# Patient Record
Sex: FEMALE | Race: WHITE | NOT HISPANIC OR LATINO | Employment: UNEMPLOYED | URBAN - METROPOLITAN AREA
[De-identification: names, ages, dates, MRNs, and addresses within clinical notes are randomized per-mention and may not be internally consistent; named-entity substitution may affect disease eponyms.]

---

## 2019-12-31 ENCOUNTER — HOSPITAL ENCOUNTER (EMERGENCY)
Facility: MEDICAL CENTER | Age: 29
End: 2019-12-31
Attending: EMERGENCY MEDICINE
Payer: COMMERCIAL

## 2019-12-31 VITALS
HEART RATE: 107 BPM | HEIGHT: 62 IN | BODY MASS INDEX: 18.37 KG/M2 | SYSTOLIC BLOOD PRESSURE: 130 MMHG | RESPIRATION RATE: 18 BRPM | TEMPERATURE: 98.7 F | DIASTOLIC BLOOD PRESSURE: 94 MMHG | WEIGHT: 99.8 LBS | OXYGEN SATURATION: 98 %

## 2019-12-31 DIAGNOSIS — Z00.8 MEDICAL CLEARANCE FOR PSYCHIATRIC ADMISSION: ICD-10-CM

## 2019-12-31 LAB
AMPHET UR QL SCN: POSITIVE
BARBITURATES UR QL SCN: NEGATIVE
BENZODIAZ UR QL SCN: NEGATIVE
BZE UR QL SCN: NEGATIVE
CANNABINOIDS UR QL SCN: NEGATIVE
METHADONE UR QL SCN: NEGATIVE
OPIATES UR QL SCN: NEGATIVE
OXYCODONE UR QL SCN: NEGATIVE
PCP UR QL SCN: NEGATIVE
POC BREATHALIZER: 0 PERCENT (ref 0–0.01)
PROPOXYPH UR QL SCN: NEGATIVE

## 2019-12-31 PROCEDURE — 80307 DRUG TEST PRSMV CHEM ANLYZR: CPT

## 2019-12-31 PROCEDURE — 99285 EMERGENCY DEPT VISIT HI MDM: CPT

## 2019-12-31 PROCEDURE — 302970 POC BREATHALIZER: Performed by: EMERGENCY MEDICINE
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
## 2020-01-01 NOTE — ED PROVIDER NOTES
ED Provider Note    CHIEF COMPLAINT  Chief Complaint   Patient presents with   • Medical Clearance     sent here for [facility]  for clearance       HPI  Anika Woody is a 29 y.o. female who presents sent here for medical clearance from [facility].  Patient states the she is having flight of ideas and suicidal ideation.  Patient has past suicide attempts.  Patient states she plans to take a weeks worth of her pills    REVIEW OF SYSTEMS  See HPI for further details.  No fever no chills.  No cough or cold symptoms.  Positive suicidal ideation all other systems are negative.    PAST MEDICAL HISTORY  History reviewed. No pertinent past medical history.    FAMILY HISTORY  Family History   Problem Relation Age of Onset   • Hypertension Other    • Cancer Other        SOCIAL HISTORY  Social History     Socioeconomic History   • Marital status: Single     Spouse name: Not on file   • Number of children: Not on file   • Years of education: Not on file   • Highest education level: Not on file   Occupational History   • Not on file   Social Needs   • Financial resource strain: Not on file   • Food insecurity:     Worry: Not on file     Inability: Not on file   • Transportation needs:     Medical: Not on file     Non-medical: Not on file   Tobacco Use   • Smoking status: Never Smoker   • Smokeless tobacco: Never Used   Substance and Sexual Activity   • Alcohol use: Yes     Comment: once a week   • Drug use: Yes     Types: Oral     Comment: edible   • Sexual activity: Not on file   Lifestyle   • Physical activity:     Days per week: Not on file     Minutes per session: Not on file   • Stress: Not on file   Relationships   • Social connections:     Talks on phone: Not on file     Gets together: Not on file     Attends Denominational service: Not on file     Active member of club or organization: Not on file     Attends meetings of clubs or organizations: Not on file     Relationship status: Not on file   • Intimate partner  "violence:     Fear of current or ex partner: Not on file     Emotionally abused: Not on file     Physically abused: Not on file     Forced sexual activity: Not on file   Other Topics Concern   • Not on file   Social History Narrative   • Not on file       SURGICAL HISTORY  Past Surgical History:   Procedure Laterality Date   • MAMMOPLASTY AUGMENTATION  5/10/2011    Performed by SPENCER BOOKER at SURGERY [facility] ORS   • TONSILLECTOMY  6/2007       CURRENT MEDICATIONS  Home Medications     Reviewed by Laina Zhong R.N. (Registered Nurse) on 12/31/19 at 1509  Med List Status: Partial   Medication Last Dose Status   Amphetamine-Dextroamphetamine (ADDERALL PO) 12/31/2019 Active   buPROPion HCl (WELLBUTRIN PO) 12/31/2019 Active   NON SPECIFIED  Active   NON SPECIFIED  Active                ALLERGIES  No Known Allergies    PHYSICAL EXAM  VITAL SIGNS: /88   Pulse (!) 103   Temp 37.7 °C (99.8 °F) (Temporal)   Resp 18   Ht 1.575 m (5' 2\")   Wt 45.3 kg (99 lb 12.8 oz)   LMP 12/15/2019   SpO2 99%   BMI 18.25 kg/m²   Constitutional: Well developed, Well nourished, No acute distress,   HENT: Normocephalic, Atraumatic, Bilateral external ears normal, Oropharynx moist, No oral exudates, Nose normal.   Eyes: PERRLA, EOMI, Conjunctiva normal, No discharge.   Neck: Normal range of motion, No tenderness, Supple, No stridor.   Cardiovascular: Normal heart rate, Normal rhythm, No murmurs, No rubs, No gallops.   Thorax & Lungs: Normal breath sounds, No respiratory distress, No wheezing, No chest tenderness.  Abdomen: Bowel sounds normal, Soft, No tenderness, No masses, No pulsatile masses.    Skin: Warm, Dry, No erythema, No rash.   Extremities: No edema, No tenderness, No cyanosis, No clubbing. Dorsalis pedis pulses 2+ equal bilaterally. Radial pulses 2+ equal bilaterally  Neurologic: Normal deep tendon reflex strength sensation intact.  Normal gait  Psychiatric: Awake alert.  Oriented x3.  Limited judgment and " insight    Results for orders placed or performed during the hospital encounter of 12/31/19   URINE DRUG SCREEN   Result Value Ref Range    Amphetamines Urine Positive (A) Negative    Barbiturates Negative Negative    Benzodiazepines Negative Negative    Cocaine Metabolite Negative Negative    Methadone Negative Negative    Opiates Negative Negative    Oxycodone Negative Negative    Phencyclidine -Pcp Negative Negative    Propoxyphene Negative Negative    Cannabinoid Metab Negative Negative   POC BREATHALIZER   Result Value Ref Range    POC Breathalizer 0.001 0.00 - 0.01 Percent         COURSE & MEDICAL DECISION MAKING  Pertinent Labs & Imaging studies reviewed. (See chart for details)  Patient was placed on a legal hold by the psychiatrist at [facility].  She is medically cleared at this facility and able to be transferred in stable condition        FINAL IMPRESSION  1.  Depression with suicidal ideation  2.  Medical clearance  3.        Electronically signed by: Palomo Wahl, 12/31/2019 5:29 PM

## 2020-03-05 ENCOUNTER — HOSPITAL ENCOUNTER (EMERGENCY)
Facility: MEDICAL CENTER | Age: 30
End: 2020-03-05
Attending: EMERGENCY MEDICINE
Payer: COMMERCIAL

## 2020-03-05 VITALS
DIASTOLIC BLOOD PRESSURE: 67 MMHG | HEIGHT: 62 IN | TEMPERATURE: 98.2 F | OXYGEN SATURATION: 99 % | BODY MASS INDEX: 20.98 KG/M2 | HEART RATE: 80 BPM | RESPIRATION RATE: 16 BRPM | WEIGHT: 114 LBS | SYSTOLIC BLOOD PRESSURE: 122 MMHG

## 2020-03-05 DIAGNOSIS — R45.851 SUICIDAL IDEATION: ICD-10-CM

## 2020-03-05 LAB
AMPHET UR QL SCN: NEGATIVE
BARBITURATES UR QL SCN: NEGATIVE
BENZODIAZ UR QL SCN: NEGATIVE
BZE UR QL SCN: NEGATIVE
CANNABINOIDS UR QL SCN: NEGATIVE
METHADONE UR QL SCN: NEGATIVE
OPIATES UR QL SCN: NEGATIVE
OXYCODONE UR QL SCN: NEGATIVE
PCP UR QL SCN: NEGATIVE
POC BREATHALIZER: 0 PERCENT (ref 0–0.01)
PROPOXYPH UR QL SCN: NEGATIVE

## 2020-03-05 PROCEDURE — 90791 PSYCH DIAGNOSTIC EVALUATION: CPT

## 2020-03-05 PROCEDURE — 302970 POC BREATHALIZER

## 2020-03-05 PROCEDURE — 80307 DRUG TEST PRSMV CHEM ANLYZR: CPT

## 2020-03-05 PROCEDURE — 302970 POC BREATHALIZER: Performed by: EMERGENCY MEDICINE

## 2020-03-05 PROCEDURE — 99285 EMERGENCY DEPT VISIT HI MDM: CPT

## 2020-03-05 RX ORDER — PALIPERIDONE 3 MG/1
3 TABLET, EXTENDED RELEASE ORAL EVERY MORNING
Status: SHIPPED | COMMUNITY
End: 2022-04-05

## 2020-03-05 RX ORDER — HYDROXYZINE HYDROCHLORIDE 25 MG/1
25 TABLET, FILM COATED ORAL 2 TIMES DAILY PRN
Status: SHIPPED | COMMUNITY
End: 2022-04-05

## 2020-03-05 RX ORDER — TOPIRAMATE 25 MG/1
25 TABLET ORAL
Status: SHIPPED | COMMUNITY
End: 2022-04-05

## 2020-03-05 RX ORDER — TRANEXAMIC ACID 650 MG/1
1300 TABLET ORAL 3 TIMES DAILY
Status: SHIPPED | COMMUNITY
End: 2022-04-05

## 2020-03-05 NOTE — ED PROVIDER NOTES
ED Provider Note    CHIEF COMPLAINT  Chief Complaint   Patient presents with   • Suicidal Ideation   • Psych Eval       HPI  Anika Woody is a 29 y.o. female who presents for evaluation of suicidal ideation.  Patient is brought in from [facility].  She evidently was having some erratic behavior there.  She states that she lost her temper and is feeling better at this point.  She states that she is suicidal and just wishes she could die.  She states she has done nothing to this point to harm herself.  If she could hold her breath long enough she would like to just hold her breath and die.  She has no medical complaints at this time.  She has no significant past medical history.    REVIEW OF SYSTEMS  See HPI for further details. All other systems negative.    PAST MEDICAL HISTORY  No past medical history on file.    FAMILY HISTORY  Family History   Problem Relation Age of Onset   • Hypertension Other    • Cancer Other        SOCIAL HISTORY  Social History     Socioeconomic History   • Marital status: Single     Spouse name: Not on file   • Number of children: Not on file   • Years of education: Not on file   • Highest education level: Not on file   Occupational History   • Not on file   Social Needs   • Financial resource strain: Not on file   • Food insecurity     Worry: Not on file     Inability: Not on file   • Transportation needs     Medical: Not on file     Non-medical: Not on file   Tobacco Use   • Smoking status: Never Smoker   • Smokeless tobacco: Never Used   Substance and Sexual Activity   • Alcohol use: Yes     Comment: once a week   • Drug use: Yes     Types: Oral     Comment: edible   • Sexual activity: Not on file   Lifestyle   • Physical activity     Days per week: Not on file     Minutes per session: Not on file   • Stress: Not on file   Relationships   • Social connections     Talks on phone: Not on file     Gets together: Not on file     Attends [employer] service: Not on file     Active member of  "club or organization: Not on file     Attends meetings of clubs or organizations: Not on file     Relationship status: Not on file   • Intimate partner violence     Fear of current or ex partner: Not on file     Emotionally abused: Not on file     Physically abused: Not on file     Forced sexual activity: Not on file   Other Topics Concern   • Not on file   Social History Narrative   • Not on file       SURGICAL HISTORY  Past Surgical History:   Procedure Laterality Date   • MAMMOPLASTY AUGMENTATION  5/10/2011    Performed by SPENCER BOOKER at SURGERY [facility] ORS   • TONSILLECTOMY  6/2007       CURRENT MEDICATIONS  Home Medications    **Home medications have not yet been reviewed for this encounter**         ALLERGIES  No Known Allergies    PHYSICAL EXAM  VITAL SIGNS: /76   Pulse 82   Temp 36.6 °C (97.9 °F) (Temporal)   Resp 15   Ht 1.575 m (5' 2\")   Wt 51.7 kg (114 lb)   SpO2 97%   BMI 20.85 kg/m²   Constitutional: Well developed, Well nourished, tearful.  HENT: Normocephalic, Atraumatic.  Eyes:  EOMI, Conjunctiva normal, No discharge.   Cardiovascular: Normal heart rate.   Thorax & Lungs: No respiratory distress.  Skin: Warm, Dry.  Musculoskeletal: Good range of motion in all major joints.   Neurologic: Awake and alert, No focal deficits noted.       COURSE & MEDICAL DECISION MAKING  Pertinent Labs & Imaging studies reviewed. (See chart for details)  This is a 29-year-old [facility] for [facility].  During my evaluation she is quite tearful and is acutely suicidal.  At this point she has no medical emergency and I see no indication for imaging or laboratory.  She will be evaluated by our alert team for disposition.    FINAL IMPRESSION  1.  Suicidal ideation  2.   3.         Electronically signed by: Jevon Cortes M.D., 3/5/2020 11:18 AM    "

## 2020-03-05 NOTE — ED NOTES
Hand off report given to HUANG Harper   Patient chart and current status reviewed with oncoming RN and all questions answered  This RN's primary care of patient terminated at this time

## 2020-03-05 NOTE — ED NOTES
Pt resting quietly in bed with eyes closed. Respirations even and unlabored. 1:1 sitter for direct observation. No new signs or symptoms of distress noted. Will continue to monitor.
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
## 2020-03-05 NOTE — ED NOTES
Pt laying in bed with eyes closed.   Pt appears comfortable at this time.  Equal rise and fall of chest noted. Breaths equal and non labored.  No signs or symptoms of distress noted at this time.  Will continue to monitor.

## 2020-03-06 NOTE — DISCHARGE PLANNING
Alert Team    Patient sitting on bed at time of rounding; this writer explained the legal hold process, which she noted she understood. Patient mood noted as euthymic; patient speaking clearly/coherently with insight in regards to her current situation and today's events. She reports she is no longer feeling suicidal and reports that she is in the process of learning more about her diagnoss of Schizoaffective DO. She denies AH/VH. Patient currently on a legal hold; she is scheduled to transfer to [facility] shortly; patient agreeable to plan, she notes no distress or concerns. Nothing further to note at this time.

## 2020-03-06 NOTE — ED NOTES
Med rec partially updated per list in pt chart from [facility]. Med rec left message and waiting for [facility] to call back with information about last doses.
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
## 2020-03-06 NOTE — DISCHARGE PLANNING
Medical Social Work    Referral: Legal Hold    Intervention: Legal Hold Paperwork given to SW by [employer] RN Gina Hook    Legal Hold Initiated: Date: 03/05/2020 Time: 1030    Patient’s Insurance Listed on Face Sheet: [place] Medicaid    Referrals sent to: [place] and [facility]    This referral contains the following information:  1) Face sheet _x___  2) Page 1 and Page 2 of Legal Hold __x__  3) Alert Team Assessment/Psych Assessment __x__  4) Head to toe physical exam __x__  5) Urine Drug Screen __x__  6) Blood Alcohol __x__  7) Vital signs _x___  8) Pregnancy test when applicable _P__  9) Medications list __x__    Plan: Patient will transfer to mental health facility once acceptance is obtained

## 2020-03-06 NOTE — DISCHARGE PLANNING
Medical Social Work     Pt has been accepted to [facility] by Dr. Simmons.  Geoffrey called [facility] and set up transport through [place] for 2300.  Geoffrey updated  bedside rn, and completed transfer packet.    GEOFFREY called [facility] and spoke with Norma for a trip ticket.     Plan: GEOFFREY will remain available for pt support.

## 2020-04-10 ENCOUNTER — NON-PROVIDER VISIT (OUTPATIENT)
Dept: URGENT CARE | Facility: PHYSICIAN GROUP | Age: 30
End: 2020-04-10

## 2020-04-10 DIAGNOSIS — Z02.1 PRE-EMPLOYMENT DRUG SCREENING: ICD-10-CM

## 2020-04-10 LAB
AMP AMPHETAMINE: NORMAL
COC COCAINE: NORMAL
INT CON NEG: NORMAL
INT CON POS: NORMAL
MET METHAMPHETAMINES: NORMAL
OPI OPIATES: NORMAL
PCP PHENCYCLIDINE: NORMAL
POC DRUG COMMENT 753798-OCCUPATIONAL HEALTH: NEGATIVE
THC: NORMAL

## 2020-04-10 PROCEDURE — 80305 DRUG TEST PRSMV DIR OPT OBS: CPT | Performed by: PHYSICIAN ASSISTANT

## 2021-03-04 ENCOUNTER — HOSPITAL ENCOUNTER (EMERGENCY)
Facility: MEDICAL CENTER | Age: 31
End: 2021-03-05
Attending: EMERGENCY MEDICINE
Payer: MEDICAID

## 2021-03-04 DIAGNOSIS — F30.9 MANIA (HCC): ICD-10-CM

## 2021-03-04 DIAGNOSIS — F25.0 SCHIZOAFFECTIVE DISORDER, BIPOLAR TYPE (HCC): ICD-10-CM

## 2021-03-04 LAB — POC BREATHALIZER: 0 PERCENT (ref 0–0.01)

## 2021-03-04 PROCEDURE — 99285 EMERGENCY DEPT VISIT HI MDM: CPT

## 2021-03-04 PROCEDURE — 80307 DRUG TEST PRSMV CHEM ANLYZR: CPT

## 2021-03-04 PROCEDURE — 302970 POC BREATHALIZER: Performed by: EMERGENCY MEDICINE

## 2021-03-04 ASSESSMENT — LIFESTYLE VARIABLES: DO YOU DRINK ALCOHOL: NO

## 2021-03-05 VITALS
RESPIRATION RATE: 18 BRPM | WEIGHT: 114.64 LBS | SYSTOLIC BLOOD PRESSURE: 119 MMHG | HEART RATE: 102 BPM | TEMPERATURE: 97.8 F | OXYGEN SATURATION: 97 % | HEIGHT: 62 IN | BODY MASS INDEX: 21.1 KG/M2 | DIASTOLIC BLOOD PRESSURE: 76 MMHG

## 2021-03-05 LAB
AMPHET UR QL SCN: POSITIVE
BARBITURATES UR QL SCN: NEGATIVE
BENZODIAZ UR QL SCN: NEGATIVE
BZE UR QL SCN: NEGATIVE
CANNABINOIDS UR QL SCN: NEGATIVE
METHADONE UR QL SCN: NEGATIVE
OPIATES UR QL SCN: NEGATIVE
OXYCODONE UR QL SCN: NEGATIVE
PCP UR QL SCN: NEGATIVE
PROPOXYPH UR QL SCN: NEGATIVE

## 2021-03-05 PROCEDURE — 90791 PSYCH DIAGNOSTIC EVALUATION: CPT
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
## 2021-03-05 NOTE — ED NOTES
Pt transferred to [facility] via EMS. EMS has chart and belongings from [place] [id number]. She  is ambulatory out of ED with EMS, steady gait noted.

## 2021-03-05 NOTE — ED TRIAGE NOTES
"Chief Complaint   Patient presents with   • Legal 2000     Pt BIB by [employer] on legal hold. Per [employer] pt has been acting erratically per family x 3D and has been off her medication, behaviors include not eating or sleeping. Pt denies SI/HI, has rambling speech, states hx of schizoaffective disorder. AOx4. Pt's belongings removed per policy, safety precautions in place.     /93   Pulse (!) 118   Temp 36.9 °C (98.5 °F) (Temporal)   Resp 15   Ht 1.575 m (5' 2\")   Wt 52 kg (114 lb 10.2 oz)   SpO2 98%   BMI 20.97 kg/m²     "

## 2021-03-05 NOTE — ED NOTES
Pt screaming at the wall and talking to herself, responding to people who are not present, ERP aware of behavior.

## 2021-03-05 NOTE — ED NOTES
Patient up to the bathroom with steady gait, holding conversations with herself while in the bathroom
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
## 2021-03-05 NOTE — ED NOTES
Report received and care assumed of patient. Patient resting comfortably on gurney with eyes closed. Legal hold precautions in place.

## 2021-03-05 NOTE — DISCHARGE PLANNING
Medical Social Work    Referral: Legal hold Transfer to Mental Health Facility    Intervention: SW received call from [facility] stating that they have accepted the patient for admission.     Pt's accepting physcian is Dr. Sadiq GUERRERO arranged for transportation to be set up through [facility]    The pt will be picked up at 0900.     YOLANDA notified the RN of the departure time as well as accepting facility.     YOLANDA created transfer packet and placed on chart.     Plan: Pt will transfer back to [facility] at 0900

## 2021-03-05 NOTE — ED NOTES
Patient back in room, warm blanket provided, denies other needs at this time.  Will continue to monitor

## 2022-04-05 ENCOUNTER — HOSPITAL ENCOUNTER (EMERGENCY)
Facility: MEDICAL CENTER | Age: 32
End: 2022-04-05
Attending: EMERGENCY MEDICINE
Payer: COMMERCIAL

## 2022-04-05 VITALS
HEIGHT: 62 IN | BODY MASS INDEX: 20.24 KG/M2 | DIASTOLIC BLOOD PRESSURE: 66 MMHG | OXYGEN SATURATION: 98 % | TEMPERATURE: 97.7 F | SYSTOLIC BLOOD PRESSURE: 110 MMHG | HEART RATE: 65 BPM | WEIGHT: 110 LBS | RESPIRATION RATE: 16 BRPM

## 2022-04-05 DIAGNOSIS — F29 PSYCHOSIS, UNSPECIFIED PSYCHOSIS TYPE (HCC): ICD-10-CM

## 2022-04-05 LAB
AMPHET UR QL SCN: POSITIVE
BARBITURATES UR QL SCN: NEGATIVE
BENZODIAZ UR QL SCN: NEGATIVE
BZE UR QL SCN: NEGATIVE
CANNABINOIDS UR QL SCN: NEGATIVE
HCG UR QL: NEGATIVE
METHADONE UR QL SCN: NEGATIVE
OPIATES UR QL SCN: NEGATIVE
OXYCODONE UR QL SCN: NEGATIVE
PCP UR QL SCN: NEGATIVE
POC BREATHALIZER: 0 PERCENT (ref 0–0.01)
PROPOXYPH UR QL SCN: NEGATIVE

## 2022-04-05 PROCEDURE — 700111 HCHG RX REV CODE 636 W/ 250 OVERRIDE (IP): Performed by: EMERGENCY MEDICINE

## 2022-04-05 PROCEDURE — 302970 POC BREATHALIZER: Performed by: EMERGENCY MEDICINE

## 2022-04-05 PROCEDURE — 96372 THER/PROPH/DIAG INJ SC/IM: CPT

## 2022-04-05 PROCEDURE — 700102 HCHG RX REV CODE 250 W/ 637 OVERRIDE(OP): Performed by: EMERGENCY MEDICINE

## 2022-04-05 PROCEDURE — 80307 DRUG TEST PRSMV CHEM ANLYZR: CPT

## 2022-04-05 PROCEDURE — 81025 URINE PREGNANCY TEST: CPT

## 2022-04-05 PROCEDURE — A9270 NON-COVERED ITEM OR SERVICE: HCPCS | Performed by: EMERGENCY MEDICINE

## 2022-04-05 PROCEDURE — 90791 PSYCH DIAGNOSTIC EVALUATION: CPT

## 2022-04-05 PROCEDURE — 99285 EMERGENCY DEPT VISIT HI MDM: CPT

## 2022-04-05 RX ORDER — LORAZEPAM 1 MG/1
1 TABLET ORAL ONCE
Status: COMPLETED | OUTPATIENT
Start: 2022-04-05 | End: 2022-04-05

## 2022-04-05 RX ORDER — HALOPERIDOL 5 MG/ML
5 INJECTION INTRAMUSCULAR ONCE
Status: COMPLETED | OUTPATIENT
Start: 2022-04-05 | End: 2022-04-05

## 2022-04-05 RX ADMIN — LORAZEPAM 1 MG: 1 TABLET ORAL at 22:17

## 2022-04-05 RX ADMIN — HALOPERIDOL LACTATE 5 MG: 5 INJECTION, SOLUTION INTRAMUSCULAR at 12:23

## 2022-04-05 ASSESSMENT — LIFESTYLE VARIABLES
DOES PATIENT WANT TO STOP DRINKING: NO
DO YOU DRINK ALCOHOL: NO

## 2022-04-05 NOTE — ED PROVIDER NOTES
"ED Provider Note    Scribed for Gibson Reece M.D. by Fidel Cifuentes. 4/5/2022, 11:00 AM.    Primary care provider: Pcp Pt States None  Means of arrival: EMS  History obtained from: Patient  History limited by: Ritika Historian    CHIEF COMPLAINT  Chief Complaint   Patient presents with   • Psych Eval     Pt BIB EMS from home for manic behavior. Per EMS, pt has been noncompliant with her prescribed psychiatric medication. Pt denies SI and HI and is cooperative at this time.        HPI  Anika Woody is a 31 y.o. female who presents to the Emergency Department for evaluation of manic behavior onset today. She states that she was brought to the ED today from home via EMS because her family felt that her behavior was erratic. She notes that there are young children who live in her neighborhood and the same individuals that called EMS stated that her behavior was affecting them, prompting them to call  EMS. She denies any abdominal pain, fevers, or cough. She has a past medical history psychiatric issues, but she refuses to elaborate further. She notes that she is currently prescribed psychiatric medication, but she did not take any today as the \"good lord told me not to\". There are no known alleviating or exacerbating factors.     History limited by: Ritika Historian    REVIEW OF SYSTEMS  Pertinent positives include manic behavior. Pertinent negatives include no fever, cough, or abdominal pain.  All other systems reviewed and negative.  History limited by: Ritika Historian    PAST MEDICAL HISTORY   Psychiatric disorders.     SURGICAL HISTORY   has a past surgical history that includes tonsillectomy (6/2007) and mammoplasty augmentation (5/10/2011).    SOCIAL HISTORY  Social History     Tobacco Use   • Smoking status: Never Smoker   • Smokeless tobacco: Never Used   Substance Use Topics   • Alcohol use: Yes     Comment: once a week   • Drug use: Yes     Types: Oral     Comment: edible      Social History     Substance and " "Sexual Activity   Drug Use Yes   • Types: Oral    Comment: edible       FAMILY HISTORY  Family History   Problem Relation Age of Onset   • Hypertension Other    • Cancer Other        CURRENT MEDICATIONS  Current Outpatient Medications   Medication Instructions   • hydrOXYzine HCl (ATARAX) 25 mg, Oral, 2 TIMES DAILY PRN   • paliperidone (INVEGA) 3 mg, Oral, EVERY MORNING   • sertraline (ZOLOFT) 50 mg, Oral, DAILY   • topiramate (TOPAMAX) 25 mg, Oral, EVERY BEDTIME   • Tranexamic Acid (LYSTEDA) 1,300 mg, Oral, 3 TIMES DAILY         ALLERGIES  No Known Allergies    PHYSICAL EXAM  VITAL SIGNS: /73   Pulse 81   Temp 35.9 °C (96.6 °F) (Temporal)   Resp 20   Ht 1.575 m (5' 2\")   Wt 49.9 kg (110 lb)   SpO2 97%   BMI 20.12 kg/m²     Constitutional: Well developed, Well nourished, No acute distress, Non-toxic appearance.   HENT: Normocephalic, Atraumatic, mucous membranes moist, no erythema, exudates, swelling, or masses, nares patent  Eyes: nonicteric  Neck: Supple, no meningismus  Lymphatic: No lymphadenopathy noted.   Cardiovascular: Regular rate and rhythm, no gallops rubs or murmurs  Lungs: Clear bilaterally   Abdomen: Bowel sounds normal, Soft, No tenderness  Skin: Warm, Dry, no rash  Genitalia: Deferred  Rectal: Deferred  Extremities: No edema  Neurologic: Alert, Oriented x3, appropriate, follows commands, moving all extremities, normal speech   Psychiatric: Affect normal, thoughts disorganized. She has wrapped the sheets in a turban like manner around her face.     DIAGNOSTIC STUDIES / PROCEDURES    LABS  Results for orders placed or performed during the hospital encounter of 04/05/22   POC BREATHALIZER   Result Value Ref Range    POC Breathalizer 0.00 0.00 - 0.01 Percent       All labs reviewed by me.      COURSE & MEDICAL DECISION MAKING  Nursing notes, VS, PMSFHx reviewed in chart.     11:00 AM Patient seen and examined at bedside. Ordered for Urine drug screen, POC breathalyzer, and HCG qualitative UR " lab to evaluate.    11:09 AM Patient placed on legal hold at this time. Paged Behavioral Health.    12:04 PM I was informed by Nursing staff that the patient was screaming and acting aggressive. She will be treated with haldol injection 5 mg.     4:31 PM Life skills evaluated that patient and is recommending continuing legal hold. The patients legal hold will be continued.     Decision Making:  This is a 31 y.o. year old female who presents with a history of apparent schizophrenia who presents with psychosis, increasing agitation and disorganized thinking.  The patient was placed on a legal hold and that has been continued after evaluation with life skills.  She is medically cleared.  Vital signs are stable.  She will be entered into ED observation status at 4:34 PM on April 5, 2002, pending transfer to a psychiatric facility.      FINAL IMPRESSION  1. Psychosis, unspecified psychosis type (HCC)          Fidel ROCHA (Scribangela), am scribing for, and in the presence of, Gibson Reece M.D..    Electronically signed by: Fidel Cifuentes (Cornelio), 4/5/2022    Gibson ROCHA M.D. personally performed the services described in this documentation, as scribed by Fidel Cifuentes in my presence, and it is both accurate and complete.    The note accurately reflects work and decisions made by me.  Gibson Reece M.D.  4/5/2022  4:35 PM

## 2022-04-05 NOTE — CONSULTS
"[facility]   TRIAGE ASSESSMENT    Name: Anika Woody  MRN: [id number]  : 1990  Age: 31 y.o.  Date of assessment: 2022  PCP: Pcp Pt States None  Persons in attendance: Patient  Patient Location: [facility]    CHIEF COMPLAINT/PRESENTING ISSUE (as stated by patient):   Chief Complaint   Patient presents with   • Psych Eval     Pt BIB EMS from home for manic behavior. Per EMS, pt has been noncompliant with her prescribed psychiatric medication. Pt denies SI and HI and is cooperative at this time.     patient states: \"I was just praising Rehan\"  Patient was not able to answer questions in detail she would only answer \"yes\" or \"no\"   Patient cannot name medications she speaks incoherently when asked direct questions.   Patient is denies SI and HI     Collateral information provided by patient mother: Mother states patient has been getting worse over the last week or so, and currently she believe the daughter has not slept in the last 4 nights, she also thinks that there could be some substances involved because the patient leaves home at night for \"a long time'.  Mom also states the patient is very loud and will go into the bathroom  And just be so loud and wont let her in.   Mom stated the patient started on a new medication recently but she could not provided any other details.     CURRENT LIVING SITUATION/SOCIAL SUPPORT/FINANCIAL RESOURCES: Patient lives at home with her mother, no current employment or other family.     BEHAVIORAL HEALTH/SUBSTANCE USE TREATMENT HISTORY  Does patient/parent report a history of prior behavioral health/substance use treatment for patient?   Yes:      Patient denies any other treatment but information below provided from medical record.   Dates Level of Care Facilty/Provider Diagnosis/Problem Medications   3/2021 OP Dr Leonard Ortega @ [facility]  Schizoaffective d/o Bipolar type 3//2020    3/5/2020 OP [facility] outpatient  " therapist Lala Isidro psychiatrist Dr Riggins Schizoaffective bipolar type, stimulant abuse     12/2019 IP  [facility]     2019 [facility]                                                      SAFETY ASSESSMENT - SELF  Does patient acknowledge current or past symptoms of dangerousness to self or is previous history noted? no  Does parent/significant other report patient has current or past symptoms of dangerousness to self? NO  Does presenting problem suggest symptoms of dangerousness to self? NO Denies SI    History of suicide by family member: no   History of suicide by friend/significant other: no  Recent change in frequency/specificity/intensity of suicidal thoughts or self-harm behavior? no  Current access to firearms, medications, or other identified means of suicide/self-harm? no  If yes, willing to restrict access to means of suicide/self-harm? no  Protective factors present:  Patient unable to give detailed information     SAFETY ASSESSMENT - OTHERS  Does patient acknowledge current or past symptoms of aggressive behavior or risk to others or is previous history noted? no  Does parent/significant other report patient has current or past symptoms of aggressive behavior or risk to others?  N\A  Does presenting problem suggest symptoms of dangerousness to others? No    LEGAL HISTORY  Does patient acknowledge history of arrest/[facility]/intermediate or is previous history noted? no    Crisis Safety Plan completed and copy given to patient? N\A    ABUSE/NEGLECT SCREENING  Does patient report feeling “unsafe” in his/her home, or afraid of anyone?  no  Does patient report any history of physical, sexual, or emotional abuse?  no  Does parent or significant other report any of the above? no  Is there evidence of neglect by self?  no  Is there evidence of neglect by a caregiver? no  Does the patient/parent report any history of CPS/APS/police involvement related to suspected abuse/neglect or domestic violence? no  Based on the  "information provided during the current assessment, is a mandated report of suspected abuse/neglect being made?  No    SUBSTANCE USE SCREENING  Yes:  Dae all substances used in the past 30 days:      Last Use Amount   []   Alcohol     []   Marijuana     []   Heroin     []   Prescription Opioids  (used without prescription, for    recreation, or in excess of prescribed amount)     []   Other Prescription  (used without prescription, for    recreation, or in excess of prescribed amount)     []   Cocaine      []   Methamphetamine     []   \"\" drugs (ectasy, MDMA)     [x]   Other substances: Adderall RX (pt unable to comfirm dose)          Breathalyzer results: 0.00  UDS results:positive for Amphetamines      What consequences does the patient associate with any of the above substance use and or addictive behaviors? None    Risk factors for detox (check all that apply):  []  Seizures   []  Diaphoretic (sweating)   []  Tremors   []  Hallucinations   []  Increased blood pressure   []  Decreased blood pressure   []  Other   [x]  None      [] Patient education on risk factors for detoxification and instructed to return to ER as needed.      MENTAL STATUS   Participation: Limited verbal participation  Grooming: Disheveled  Orientation: Alert and Disoriented to: Situation   Behavior: Tense  Eye contact: Limited  Mood: Anxious and Irritable Manic  Affect: Full range  Thought process: Tangential and Flight of ideas  Thought content: Preoccupation  Speech: Rapid  Perception: Derealization  Memory:  Poor memory for chronology of events  Insight: Poor  Judgment:  Poor  Other:    Collateral information:   Source:  [] Significant other present in person:   [] Significant other by telephone  [] [facility]   [x] [facility]  [x] [facility]  [x] Other: Patients mother Gracy [phone]    [] Unable to complete full assessment due to:  [] Acute intoxication  [] Patient declined to " participate/engage  [] Patient verbally unresponsive  [] Significant cognitive deficits  [] Significant perceptual distortions or behavioral disorganization  [] Other:      CLINICAL IMPRESSIONS:  Primary:  Psych Eval   Secondary:   Schizoaffective bipolar type by History       IDENTIFIED NEEDS/PLAN:  [Trigger DISPOSITION list for any items marked]    [x]  Imminent safety risk - self [] Imminent safety risk - others   []  Acute substance withdrawal [x]  Psychosis/Impaired reality testing   []  Mood/anxiety []  Substance use/Addictive behavior   []  Maladaptive behaviro []  Parent/child conflict   []  Family/Couples conflict []  Biomedical   []  Housing []  Financial   []   Legal  Occupational/Educational   []  Domestic violence []  Other:     Recommended Plan of Care:  Actively being addressed by [facility], Refer to [facility], [facility] and [facility] , 1:1 Observation and No visitors or belongings, Continue Legal Hold   *Telesitter may not be utilized for moderate or high risk patients    Has the Recommended Plan of Care/Level of Observation been reviewed with the patient's assigned nurse? Yes [place]   Does patient/parent or guardian express agreement with the above plan? yes  If a pediatric/adolescent patient, have out of town/out of state inpatient MH tx options been reviewed with parent/legal guardian with verbal consent given for referrals to be sent? yes    Referral appointment(s) scheduled? N\A    Alert team only:   I have discussed findings and recommendations with Dr. Reece who is in agreement with these recommendations.     Referral information sent to the following outpatient community providers :N/A     Referral information sent to the following inpatient community providers : [facility], [facility], [facility]     If applicable : Referred  to  Alert Team for legal hold follow up at (time): 4/5/2022      Ila RENDON  MACK Joiner  4/5/2022

## 2022-04-05 NOTE — ED TRIAGE NOTES
"Chief Complaint   Patient presents with   • Psych Eval     Pt BIB EMS from home for manic behavior. Per EMS, pt has been noncompliant with her prescribed psychiatric medication. Pt denies SI and HI and is cooperative at this time.      /73   Pulse 81   Temp 35.9 °C (96.6 °F) (Temporal)   Resp 20   Ht 1.575 m (5' 2\")   Wt 49.9 kg (110 lb)   SpO2 97%   BMI 20.12 kg/m²       "

## 2022-04-05 NOTE — ED NOTES
Break RN  Pt addierclayton removed from room and mattress placed on floor. Pt is playing with juan, unsafe at this time. Pt is pacing in room conversing with self. Sitter in direct observation.

## 2022-04-05 NOTE — ED NOTES
Medication reconciliation process is complete.  Per chart review and calls to local pharmacy.   No current prescription(s) fills at  [facility].    Chart review indicates  That pt has taken  Hydroxyzine 25 MG bidprn  invega 3 mg ER morning  sertraline 50 mg   Daily  topiramate 25mg  Bedtime  lysteda 650 mg tid

## 2022-04-05 NOTE — ED NOTES
Pt's personal clothes taken and pt dressed in a gown. Pt verbalizes understanding for a need for a urine sample. Pt laying on the ground and praying.

## 2022-04-05 NOTE — DISCHARGE PLANNING
Alert Team/Behavioral Health Note:    As of 1600, patient is not medically cleared. Waiting on medical clearance to send inpatient psychiatric referrals.

## 2022-04-05 NOTE — ED NOTES
Patient in hospital gown, all personal belongings removed. All potentially harmful non medically essential items removed from room. Belongings placed in locker in ambulance foyer.

## 2022-04-05 NOTE — CONSULTS
Alert team aware of ordered consult, the Alert Team staff will be available for consult at 1300 on 3/5/2022

## 2022-04-05 NOTE — ED NOTES
Received report and assumed care of pt. 1:1 sitter outside of room watching pt at all times. Pt is calm and cooperative and currently laying in bed.

## 2022-04-06 NOTE — ED NOTES
Pt attempting to elope after updated on plan of care. Pt redirected back to room. Alert team bedside. Pt remains under direct observation of 1:1 sitter at all times. Pt provided with socks and warm blanket.

## 2022-04-06 NOTE — ED NOTES
Pt taken to [facility] by [facility]. VSS at discharge. All belongings and legal hold paperwork given to ems at time of transport.

## 2022-04-06 NOTE — DISCHARGE PLANNING
Alert Team:     Referral: Minor/Adult Patient Transfer to Mental Health Facility     Intervention: Received call from [facility] stating that Dr. Robert has accepted the patient for admission.  Facility requests that transport be arranged for 2230.     Arranged for transportation to be set up through Susan at [facility] sunil Garcia with [facility].     The pt will be picked up at 2230.      Notified the RN of the departure time as well as accepting facility.      Created transfer packet and placed on chart. (Cobra completed with parent.)     Plan: Pt will transfer to [facility] at 2230.

## 2022-04-06 NOTE — ED NOTES
Pt sleeping in rney comfortably. No acute distress noted. Pt remains in direct observation of 1:1 sitter at all times.

## 2022-04-06 NOTE — DISCHARGE PLANNING
[facility] ED Behavioral Health Fax Referral      [facility] ED Behavioral Health Alert Team:  [phone]    Referral: Legal Hold    Intervention: Patient referral to [facility] inpatient  facillity    Legal Hold Initiated: Date: 4/5/22 Time: 1100    Patient’s Insurance Listed on Face Sheet: [employer]    Referrals sent to: [facility]    Referrals faxed by HUANG Luevano    This referral contains the following information:  1) Face sheet _x___  2) Page 1 and Page 2 of Legal Hold ___x_  3) Alert Team Assessment/Psych Assessment __x__  4) Head to toe physical exam __x__  5) Urine Drug Screen __x__  6) Blood Alcohol _x___  7) Vital signs _x___  8) Pregnancy test when applicable ___  9) Medications list __x__  10) Covid screening ____    Plan: Patient will transfer to mental health facility once acceptance is obtained

## 2022-04-06 NOTE — ED NOTES
Report received from HUANG Lindo. Pt resting on mattress comfortably. No acute distress noted. Pt remains under direct observation of 1:1 sitter at all times.

## 2022-04-06 NOTE — ED NOTES
Pt ambulated to BR with steady gait to provide urine sample. Urine sent. Pt remains under direct observation of 1:1 sitter at all times.

## 2022-06-10 ENCOUNTER — HOSPITAL ENCOUNTER (INPATIENT)
Facility: MEDICAL CENTER | Age: 32
LOS: 1 days | DRG: 641 | End: 2022-06-12
Attending: EMERGENCY MEDICINE | Admitting: STUDENT IN AN ORGANIZED HEALTH CARE EDUCATION/TRAINING PROGRAM
Payer: COMMERCIAL

## 2022-06-10 DIAGNOSIS — R56.9 SEIZURE-LIKE ACTIVITY (HCC): ICD-10-CM

## 2022-06-10 DIAGNOSIS — E87.1 HYPONATREMIA: ICD-10-CM

## 2022-06-10 PROCEDURE — 85379 FIBRIN DEGRADATION QUANT: CPT

## 2022-06-10 PROCEDURE — 84100 ASSAY OF PHOSPHORUS: CPT

## 2022-06-10 PROCEDURE — 99285 EMERGENCY DEPT VISIT HI MDM: CPT

## 2022-06-10 PROCEDURE — 83735 ASSAY OF MAGNESIUM: CPT

## 2022-06-10 PROCEDURE — 84146 ASSAY OF PROLACTIN: CPT

## 2022-06-11 ENCOUNTER — APPOINTMENT (OUTPATIENT)
Dept: RADIOLOGY | Facility: MEDICAL CENTER | Age: 32
DRG: 641 | End: 2022-06-11
Attending: EMERGENCY MEDICINE
Payer: COMMERCIAL

## 2022-06-11 ENCOUNTER — APPOINTMENT (OUTPATIENT)
Dept: RADIOLOGY | Facility: MEDICAL CENTER | Age: 32
DRG: 641 | End: 2022-06-11
Attending: STUDENT IN AN ORGANIZED HEALTH CARE EDUCATION/TRAINING PROGRAM
Payer: COMMERCIAL

## 2022-06-11 PROBLEM — R56.9 SEIZURE-LIKE ACTIVITY (HCC): Status: ACTIVE | Noted: 2022-06-11

## 2022-06-11 PROBLEM — R11.2 NAUSEA & VOMITING: Status: ACTIVE | Noted: 2022-06-11

## 2022-06-11 PROBLEM — R51.9 HEADACHE: Status: ACTIVE | Noted: 2022-06-11

## 2022-06-11 PROBLEM — E87.1 HYPONATREMIA: Status: ACTIVE | Noted: 2022-06-11

## 2022-06-11 PROBLEM — H53.8 BLURRED VISION: Status: ACTIVE | Noted: 2022-06-11

## 2022-06-11 LAB
ALBUMIN SERPL BCP-MCNC: 4.3 G/DL (ref 3.2–4.9)
ALBUMIN/GLOB SERPL: 2 G/DL
ALP SERPL-CCNC: 54 U/L (ref 30–99)
ALT SERPL-CCNC: 24 U/L (ref 2–50)
AMPHET UR QL SCN: POSITIVE
ANION GAP SERPL CALC-SCNC: 13 MMOL/L (ref 7–16)
ANION GAP SERPL CALC-SCNC: 13 MMOL/L (ref 7–16)
ANION GAP SERPL CALC-SCNC: 17 MMOL/L (ref 7–16)
ANION GAP SERPL CALC-SCNC: 18 MMOL/L (ref 7–16)
APPEARANCE UR: CLEAR
AST SERPL-CCNC: 47 U/L (ref 12–45)
BARBITURATES UR QL SCN: NEGATIVE
BASOPHILS # BLD AUTO: 0.3 % (ref 0–1.8)
BASOPHILS # BLD: 0.01 K/UL (ref 0–0.12)
BENZODIAZ UR QL SCN: NEGATIVE
BILIRUB SERPL-MCNC: 0.9 MG/DL (ref 0.1–1.5)
BILIRUB UR QL STRIP.AUTO: NEGATIVE
BUN SERPL-MCNC: 13 MG/DL (ref 8–22)
BUN SERPL-MCNC: 8 MG/DL (ref 8–22)
BUN SERPL-MCNC: 8 MG/DL (ref 8–22)
BUN SERPL-MCNC: 9 MG/DL (ref 8–22)
BZE UR QL SCN: NEGATIVE
CALCIUM SERPL-MCNC: 7.8 MG/DL (ref 8.5–10.5)
CALCIUM SERPL-MCNC: 7.9 MG/DL (ref 8.5–10.5)
CALCIUM SERPL-MCNC: 8 MG/DL (ref 8.5–10.5)
CALCIUM SERPL-MCNC: 8 MG/DL (ref 8.5–10.5)
CANNABINOIDS UR QL SCN: NEGATIVE
CHLORIDE SERPL-SCNC: 100 MMOL/L (ref 96–112)
CHLORIDE SERPL-SCNC: 87 MMOL/L (ref 96–112)
CHLORIDE SERPL-SCNC: 98 MMOL/L (ref 96–112)
CHLORIDE SERPL-SCNC: 99 MMOL/L (ref 96–112)
CO2 SERPL-SCNC: 17 MMOL/L (ref 20–33)
CO2 SERPL-SCNC: 18 MMOL/L (ref 20–33)
CO2 SERPL-SCNC: 21 MMOL/L (ref 20–33)
CO2 SERPL-SCNC: 22 MMOL/L (ref 20–33)
COLOR UR: YELLOW
CREAT SERPL-MCNC: 0.6 MG/DL (ref 0.5–1.4)
CREAT SERPL-MCNC: 0.67 MG/DL (ref 0.5–1.4)
CREAT SERPL-MCNC: 0.67 MG/DL (ref 0.5–1.4)
CREAT SERPL-MCNC: 0.72 MG/DL (ref 0.5–1.4)
D DIMER PPP IA.FEU-MCNC: 0.44 UG/ML (FEU) (ref 0–0.5)
EKG IMPRESSION: NORMAL
EOSINOPHIL # BLD AUTO: 0 K/UL (ref 0–0.51)
EOSINOPHIL NFR BLD: 0 % (ref 0–6.9)
ERYTHROCYTE [DISTWIDTH] IN BLOOD BY AUTOMATED COUNT: 38.9 FL (ref 35.9–50)
ETHANOL BLD-MCNC: <10.1 MG/DL
GFR SERPLBLD CREATININE-BSD FMLA CKD-EPI: 114 ML/MIN/1.73 M 2
GFR SERPLBLD CREATININE-BSD FMLA CKD-EPI: 119 ML/MIN/1.73 M 2
GFR SERPLBLD CREATININE-BSD FMLA CKD-EPI: 119 ML/MIN/1.73 M 2
GFR SERPLBLD CREATININE-BSD FMLA CKD-EPI: 122 ML/MIN/1.73 M 2
GLOBULIN SER CALC-MCNC: 2.1 G/DL (ref 1.9–3.5)
GLUCOSE SERPL-MCNC: 139 MG/DL (ref 65–99)
GLUCOSE SERPL-MCNC: 70 MG/DL (ref 65–99)
GLUCOSE SERPL-MCNC: 88 MG/DL (ref 65–99)
GLUCOSE SERPL-MCNC: 92 MG/DL (ref 65–99)
GLUCOSE UR STRIP.AUTO-MCNC: NEGATIVE MG/DL
HCG SERPL QL: NEGATIVE
HCT VFR BLD AUTO: 39.7 % (ref 37–47)
HGB BLD-MCNC: 14.3 G/DL (ref 12–16)
IMM GRANULOCYTES # BLD AUTO: 0.01 K/UL (ref 0–0.11)
IMM GRANULOCYTES NFR BLD AUTO: 0.3 % (ref 0–0.9)
KETONES UR STRIP.AUTO-MCNC: 80 MG/DL
LACTATE BLD-SCNC: 1.9 MMOL/L (ref 0.5–2)
LEUKOCYTE ESTERASE UR QL STRIP.AUTO: NEGATIVE
LYMPHOCYTES # BLD AUTO: 0.95 K/UL (ref 1–4.8)
LYMPHOCYTES NFR BLD: 26.8 % (ref 22–41)
MAGNESIUM SERPL-MCNC: 1.9 MG/DL (ref 1.5–2.5)
MCH RBC QN AUTO: 31.3 PG (ref 27–33)
MCHC RBC AUTO-ENTMCNC: 36 G/DL (ref 33.6–35)
MCV RBC AUTO: 86.9 FL (ref 81.4–97.8)
METHADONE UR QL SCN: NEGATIVE
MICRO URNS: ABNORMAL
MONOCYTES # BLD AUTO: 0.34 K/UL (ref 0–0.85)
MONOCYTES NFR BLD AUTO: 9.6 % (ref 0–13.4)
NEUTROPHILS # BLD AUTO: 2.24 K/UL (ref 2–7.15)
NEUTROPHILS NFR BLD: 63 % (ref 44–72)
NITRITE UR QL STRIP.AUTO: NEGATIVE
NRBC # BLD AUTO: 0 K/UL
NRBC BLD-RTO: 0 /100 WBC
OPIATES UR QL SCN: NEGATIVE
OSMOLALITY UR: 652 MOSM/KG H2O (ref 300–900)
OXYCODONE UR QL SCN: NEGATIVE
PCP UR QL SCN: NEGATIVE
PH UR STRIP.AUTO: 5 [PH] (ref 5–8)
PHOSPHATE SERPL-MCNC: 2.7 MG/DL (ref 2.5–4.5)
PLATELET # BLD AUTO: 230 K/UL (ref 164–446)
PMV BLD AUTO: 9.9 FL (ref 9–12.9)
POTASSIUM SERPL-SCNC: 3.4 MMOL/L (ref 3.6–5.5)
POTASSIUM SERPL-SCNC: 3.6 MMOL/L (ref 3.6–5.5)
POTASSIUM SERPL-SCNC: 3.6 MMOL/L (ref 3.6–5.5)
POTASSIUM SERPL-SCNC: 3.7 MMOL/L (ref 3.6–5.5)
PROLACTIN SERPL-MCNC: 20.8 NG/ML (ref 2.8–26)
PROPOXYPH UR QL SCN: NEGATIVE
PROT SERPL-MCNC: 6.4 G/DL (ref 6–8.2)
PROT UR QL STRIP: NEGATIVE MG/DL
RBC # BLD AUTO: 4.57 M/UL (ref 4.2–5.4)
RBC UR QL AUTO: NEGATIVE
SODIUM SERPL-SCNC: 122 MMOL/L (ref 135–145)
SODIUM SERPL-SCNC: 133 MMOL/L (ref 135–145)
SODIUM SERPL-SCNC: 133 MMOL/L (ref 135–145)
SODIUM SERPL-SCNC: 135 MMOL/L (ref 135–145)
SODIUM UR-SCNC: 61 MMOL/L
SP GR UR STRIP.AUTO: 1.02
UROBILINOGEN UR STRIP.AUTO-MCNC: 0.2 MG/DL
WBC # BLD AUTO: 3.6 K/UL (ref 4.8–10.8)

## 2022-06-11 PROCEDURE — 70450 CT HEAD/BRAIN W/O DYE: CPT

## 2022-06-11 PROCEDURE — 700102 HCHG RX REV CODE 250 W/ 637 OVERRIDE(OP): Performed by: STUDENT IN AN ORGANIZED HEALTH CARE EDUCATION/TRAINING PROGRAM

## 2022-06-11 PROCEDURE — 82077 ASSAY SPEC XCP UR&BREATH IA: CPT

## 2022-06-11 PROCEDURE — 770000 HCHG ROOM/CARE - INTERMEDIATE ICU *

## 2022-06-11 PROCEDURE — 96375 TX/PRO/DX INJ NEW DRUG ADDON: CPT

## 2022-06-11 PROCEDURE — 80053 COMPREHEN METABOLIC PANEL: CPT

## 2022-06-11 PROCEDURE — 81003 URINALYSIS AUTO W/O SCOPE: CPT

## 2022-06-11 PROCEDURE — 93005 ELECTROCARDIOGRAM TRACING: CPT | Performed by: EMERGENCY MEDICINE

## 2022-06-11 PROCEDURE — 700111 HCHG RX REV CODE 636 W/ 250 OVERRIDE (IP): Performed by: STUDENT IN AN ORGANIZED HEALTH CARE EDUCATION/TRAINING PROGRAM

## 2022-06-11 PROCEDURE — 700102 HCHG RX REV CODE 250 W/ 637 OVERRIDE(OP): Performed by: EMERGENCY MEDICINE

## 2022-06-11 PROCEDURE — 71045 X-RAY EXAM CHEST 1 VIEW: CPT

## 2022-06-11 PROCEDURE — 84703 CHORIONIC GONADOTROPIN ASSAY: CPT

## 2022-06-11 PROCEDURE — 99291 CRITICAL CARE FIRST HOUR: CPT | Mod: 25 | Performed by: STUDENT IN AN ORGANIZED HEALTH CARE EDUCATION/TRAINING PROGRAM

## 2022-06-11 PROCEDURE — 700102 HCHG RX REV CODE 250 W/ 637 OVERRIDE(OP): Performed by: HOSPITALIST

## 2022-06-11 PROCEDURE — 84300 ASSAY OF URINE SODIUM: CPT

## 2022-06-11 PROCEDURE — 80048 BASIC METABOLIC PNL TOTAL CA: CPT | Mod: 91

## 2022-06-11 PROCEDURE — 700105 HCHG RX REV CODE 258: Performed by: STUDENT IN AN ORGANIZED HEALTH CARE EDUCATION/TRAINING PROGRAM

## 2022-06-11 PROCEDURE — 83605 ASSAY OF LACTIC ACID: CPT

## 2022-06-11 PROCEDURE — 96365 THER/PROPH/DIAG IV INF INIT: CPT

## 2022-06-11 PROCEDURE — A9270 NON-COVERED ITEM OR SERVICE: HCPCS | Performed by: STUDENT IN AN ORGANIZED HEALTH CARE EDUCATION/TRAINING PROGRAM

## 2022-06-11 PROCEDURE — 700111 HCHG RX REV CODE 636 W/ 250 OVERRIDE (IP): Performed by: EMERGENCY MEDICINE

## 2022-06-11 PROCEDURE — 83935 ASSAY OF URINE OSMOLALITY: CPT

## 2022-06-11 PROCEDURE — A9270 NON-COVERED ITEM OR SERVICE: HCPCS | Performed by: HOSPITALIST

## 2022-06-11 PROCEDURE — 80307 DRUG TEST PRSMV CHEM ANLYZR: CPT

## 2022-06-11 PROCEDURE — 99292 CRITICAL CARE ADDL 30 MIN: CPT | Performed by: STUDENT IN AN ORGANIZED HEALTH CARE EDUCATION/TRAINING PROGRAM

## 2022-06-11 PROCEDURE — 85025 COMPLETE CBC W/AUTO DIFF WBC: CPT

## 2022-06-11 PROCEDURE — 96372 THER/PROPH/DIAG INJ SC/IM: CPT

## 2022-06-11 PROCEDURE — 700105 HCHG RX REV CODE 258: Performed by: EMERGENCY MEDICINE

## 2022-06-11 PROCEDURE — 700105 HCHG RX REV CODE 258: Performed by: HOSPITALIST

## 2022-06-11 PROCEDURE — 36415 COLL VENOUS BLD VENIPUNCTURE: CPT

## 2022-06-11 PROCEDURE — A9270 NON-COVERED ITEM OR SERVICE: HCPCS | Performed by: EMERGENCY MEDICINE

## 2022-06-11 RX ORDER — PROMETHAZINE HYDROCHLORIDE 25 MG/1
12.5-25 SUPPOSITORY RECTAL EVERY 4 HOURS PRN
Status: DISCONTINUED | OUTPATIENT
Start: 2022-06-11 | End: 2022-06-12 | Stop reason: HOSPADM

## 2022-06-11 RX ORDER — OXYCODONE HYDROCHLORIDE 5 MG/1
2.5 TABLET ORAL
Status: DISCONTINUED | OUTPATIENT
Start: 2022-06-11 | End: 2022-06-12 | Stop reason: HOSPADM

## 2022-06-11 RX ORDER — BUTALBITAL, ACETAMINOPHEN AND CAFFEINE 50; 325; 40 MG/1; MG/1; MG/1
1 TABLET ORAL EVERY 6 HOURS PRN
Status: DISCONTINUED | OUTPATIENT
Start: 2022-06-11 | End: 2022-06-12 | Stop reason: HOSPADM

## 2022-06-11 RX ORDER — ONDANSETRON 2 MG/ML
4 INJECTION INTRAMUSCULAR; INTRAVENOUS EVERY 4 HOURS PRN
Status: DISCONTINUED | OUTPATIENT
Start: 2022-06-11 | End: 2022-06-12 | Stop reason: HOSPADM

## 2022-06-11 RX ORDER — PROMETHAZINE HYDROCHLORIDE 25 MG/1
12.5-25 TABLET ORAL EVERY 4 HOURS PRN
Status: DISCONTINUED | OUTPATIENT
Start: 2022-06-11 | End: 2022-06-12 | Stop reason: HOSPADM

## 2022-06-11 RX ORDER — HYDROMORPHONE HYDROCHLORIDE 1 MG/ML
0.25 INJECTION, SOLUTION INTRAMUSCULAR; INTRAVENOUS; SUBCUTANEOUS
Status: DISCONTINUED | OUTPATIENT
Start: 2022-06-11 | End: 2022-06-12 | Stop reason: HOSPADM

## 2022-06-11 RX ORDER — ACETAMINOPHEN 325 MG/1
650 TABLET ORAL ONCE
Status: COMPLETED | OUTPATIENT
Start: 2022-06-11 | End: 2022-06-11

## 2022-06-11 RX ORDER — DEXTROSE MONOHYDRATE 50 MG/ML
INJECTION, SOLUTION INTRAVENOUS ONCE
Status: DISCONTINUED | OUTPATIENT
Start: 2022-06-11 | End: 2022-06-11

## 2022-06-11 RX ORDER — POTASSIUM CHLORIDE 20 MEQ/1
40 TABLET, EXTENDED RELEASE ORAL ONCE
Status: COMPLETED | OUTPATIENT
Start: 2022-06-11 | End: 2022-06-12

## 2022-06-11 RX ORDER — HYDRALAZINE HYDROCHLORIDE 20 MG/ML
10 INJECTION INTRAMUSCULAR; INTRAVENOUS EVERY 4 HOURS PRN
Status: DISCONTINUED | OUTPATIENT
Start: 2022-06-11 | End: 2022-06-12 | Stop reason: HOSPADM

## 2022-06-11 RX ORDER — ACETAMINOPHEN 325 MG/1
650 TABLET ORAL EVERY 6 HOURS PRN
Status: DISCONTINUED | OUTPATIENT
Start: 2022-06-11 | End: 2022-06-12 | Stop reason: HOSPADM

## 2022-06-11 RX ORDER — BUPROPION HYDROCHLORIDE 150 MG/1
150 TABLET, EXTENDED RELEASE ORAL DAILY
COMMUNITY
End: 2024-01-12

## 2022-06-11 RX ORDER — SODIUM CHLORIDE 9 MG/ML
1000 INJECTION, SOLUTION INTRAVENOUS ONCE
Status: DISCONTINUED | OUTPATIENT
Start: 2022-06-11 | End: 2022-06-11

## 2022-06-11 RX ORDER — 3% SODIUM CHLORIDE 3 G/100ML
INJECTION, SOLUTION INTRAVENOUS ONCE
Status: COMPLETED | OUTPATIENT
Start: 2022-06-11 | End: 2022-06-11

## 2022-06-11 RX ORDER — ONDANSETRON 4 MG/1
4 TABLET, ORALLY DISINTEGRATING ORAL EVERY 4 HOURS PRN
Status: DISCONTINUED | OUTPATIENT
Start: 2022-06-11 | End: 2022-06-12 | Stop reason: HOSPADM

## 2022-06-11 RX ORDER — OXYCODONE HYDROCHLORIDE 5 MG/1
5 TABLET ORAL
Status: DISCONTINUED | OUTPATIENT
Start: 2022-06-11 | End: 2022-06-12 | Stop reason: HOSPADM

## 2022-06-11 RX ORDER — 3% SODIUM CHLORIDE 3 G/100ML
INJECTION, SOLUTION INTRAVENOUS CONTINUOUS
Status: DISCONTINUED | OUTPATIENT
Start: 2022-06-11 | End: 2022-06-11

## 2022-06-11 RX ORDER — RISPERIDONE 1 MG/1
2 TABLET ORAL 2 TIMES DAILY
Status: DISCONTINUED | OUTPATIENT
Start: 2022-06-11 | End: 2022-06-12 | Stop reason: HOSPADM

## 2022-06-11 RX ORDER — POLYETHYLENE GLYCOL 3350 17 G/17G
1 POWDER, FOR SOLUTION ORAL
Status: DISCONTINUED | OUTPATIENT
Start: 2022-06-11 | End: 2022-06-12 | Stop reason: HOSPADM

## 2022-06-11 RX ORDER — SODIUM CHLORIDE, SODIUM LACTATE, POTASSIUM CHLORIDE, AND CALCIUM CHLORIDE .6; .31; .03; .02 G/100ML; G/100ML; G/100ML; G/100ML
1000 INJECTION, SOLUTION INTRAVENOUS ONCE
Status: DISCONTINUED | OUTPATIENT
Start: 2022-06-11 | End: 2022-06-11

## 2022-06-11 RX ORDER — PROCHLORPERAZINE EDISYLATE 5 MG/ML
5-10 INJECTION INTRAMUSCULAR; INTRAVENOUS EVERY 4 HOURS PRN
Status: DISCONTINUED | OUTPATIENT
Start: 2022-06-11 | End: 2022-06-12 | Stop reason: HOSPADM

## 2022-06-11 RX ORDER — BISACODYL 10 MG
10 SUPPOSITORY, RECTAL RECTAL
Status: DISCONTINUED | OUTPATIENT
Start: 2022-06-11 | End: 2022-06-12 | Stop reason: HOSPADM

## 2022-06-11 RX ORDER — DEXTROAMPHETAMINE SACCHARATE, AMPHETAMINE ASPARTATE, DEXTROAMPHETAMINE SULFATE AND AMPHETAMINE SULFATE 2.5; 2.5; 2.5; 2.5 MG/1; MG/1; MG/1; MG/1
20 TABLET ORAL 2 TIMES DAILY
Status: DISCONTINUED | OUTPATIENT
Start: 2022-06-11 | End: 2022-06-12 | Stop reason: HOSPADM

## 2022-06-11 RX ORDER — AMOXICILLIN 250 MG
2 CAPSULE ORAL 2 TIMES DAILY
Status: DISCONTINUED | OUTPATIENT
Start: 2022-06-11 | End: 2022-06-12 | Stop reason: HOSPADM

## 2022-06-11 RX ORDER — DEXTROSE MONOHYDRATE 50 MG/ML
INJECTION, SOLUTION INTRAVENOUS CONTINUOUS
Status: DISCONTINUED | OUTPATIENT
Start: 2022-06-11 | End: 2022-06-12

## 2022-06-11 RX ORDER — TOPIRAMATE 25 MG/1
25 TABLET ORAL
Status: DISCONTINUED | OUTPATIENT
Start: 2022-06-11 | End: 2022-06-12 | Stop reason: HOSPADM

## 2022-06-11 RX ORDER — ONDANSETRON 4 MG/1
4 TABLET, ORALLY DISINTEGRATING ORAL ONCE
Status: COMPLETED | OUTPATIENT
Start: 2022-06-11 | End: 2022-06-11

## 2022-06-11 RX ORDER — DEXTROAMPHETAMINE SACCHARATE, AMPHETAMINE ASPARTATE, DEXTROAMPHETAMINE SULFATE AND AMPHETAMINE SULFATE 5; 5; 5; 5 MG/1; MG/1; MG/1; MG/1
20 TABLET ORAL 2 TIMES DAILY
COMMUNITY
End: 2024-01-12

## 2022-06-11 RX ORDER — TOPIRAMATE 25 MG/1
75 TABLET ORAL EVERY 12 HOURS
Status: DISCONTINUED | OUTPATIENT
Start: 2022-06-11 | End: 2022-06-12 | Stop reason: HOSPADM

## 2022-06-11 RX ORDER — DEXTROSE MONOHYDRATE 50 MG/ML
500 INJECTION, SOLUTION INTRAVENOUS ONCE
Status: COMPLETED | OUTPATIENT
Start: 2022-06-11 | End: 2022-06-11

## 2022-06-11 RX ORDER — BUPROPION HYDROCHLORIDE 150 MG/1
150 TABLET, EXTENDED RELEASE ORAL DAILY
Status: DISCONTINUED | OUTPATIENT
Start: 2022-06-11 | End: 2022-06-12 | Stop reason: HOSPADM

## 2022-06-11 RX ORDER — LORAZEPAM 2 MG/ML
0.5 INJECTION INTRAMUSCULAR
Status: DISCONTINUED | OUTPATIENT
Start: 2022-06-11 | End: 2022-06-12 | Stop reason: HOSPADM

## 2022-06-11 RX ORDER — POTASSIUM CHLORIDE 20 MEQ/1
20 TABLET, EXTENDED RELEASE ORAL ONCE
Status: COMPLETED | OUTPATIENT
Start: 2022-06-11 | End: 2022-06-11

## 2022-06-11 RX ORDER — DEXTROSE MONOHYDRATE 50 MG/ML
1000 INJECTION, SOLUTION INTRAVENOUS ONCE
Status: COMPLETED | OUTPATIENT
Start: 2022-06-11 | End: 2022-06-11

## 2022-06-11 RX ORDER — SODIUM CHLORIDE 9 MG/ML
INJECTION, SOLUTION INTRAVENOUS CONTINUOUS
Status: DISCONTINUED | OUTPATIENT
Start: 2022-06-11 | End: 2022-06-11

## 2022-06-11 RX ORDER — ENOXAPARIN SODIUM 100 MG/ML
40 INJECTION SUBCUTANEOUS DAILY
Status: DISCONTINUED | OUTPATIENT
Start: 2022-06-11 | End: 2022-06-11

## 2022-06-11 RX ORDER — TOPIRAMATE 50 MG/1
25-75 TABLET, FILM COATED ORAL 3 TIMES DAILY
COMMUNITY
End: 2024-01-12

## 2022-06-11 RX ADMIN — RISPERIDONE 2 MG: 1 TABLET ORAL at 11:55

## 2022-06-11 RX ADMIN — POTASSIUM CHLORIDE 20 MEQ: 1500 TABLET, EXTENDED RELEASE ORAL at 17:16

## 2022-06-11 RX ADMIN — BUPROPION HYDROCHLORIDE 150 MG: 150 TABLET, EXTENDED RELEASE ORAL at 11:55

## 2022-06-11 RX ADMIN — DEXTROSE MONOHYDRATE 1000 ML: 50 INJECTION, SOLUTION INTRAVENOUS at 15:45

## 2022-06-11 RX ADMIN — DEXTROSE MONOHYDRATE 500 ML: 50 INJECTION, SOLUTION INTRAVENOUS at 12:15

## 2022-06-11 RX ADMIN — TOPIRAMATE 25 MG: 25 TABLET, FILM COATED ORAL at 21:12

## 2022-06-11 RX ADMIN — RISPERIDONE 2 MG: 1 TABLET ORAL at 17:17

## 2022-06-11 RX ADMIN — DEXTROSE MONOHYDRATE: 50 INJECTION, SOLUTION INTRAVENOUS at 11:56

## 2022-06-11 RX ADMIN — ONDANSETRON HYDROCHLORIDE 4 MG: 2 SOLUTION INTRAMUSCULAR; INTRAVENOUS at 05:37

## 2022-06-11 RX ADMIN — RIVAROXABAN 10 MG: 10 TABLET, FILM COATED ORAL at 17:16

## 2022-06-11 RX ADMIN — SODIUM CHLORIDE: 9 INJECTION, SOLUTION INTRAVENOUS at 06:45

## 2022-06-11 RX ADMIN — DEXTROAMPHETAMINE SACCHARATE, AMPHETAMINE ASPARTATE, DEXTROAMPHETAMINE SULFATE, AMPHETAMINE SULFATE TABLETS, 10 MG,CLL 20 MG: 2.5; 2.5; 2.5; 2.5 TABLET ORAL at 17:17

## 2022-06-11 RX ADMIN — SODIUM CHLORIDE: 3 INJECTION, SOLUTION INTRAVENOUS at 04:43

## 2022-06-11 RX ADMIN — BUTALBITAL, ACETAMINOPHEN, AND CAFFEINE 1 TABLET: 50; 325; 40 TABLET ORAL at 05:37

## 2022-06-11 RX ADMIN — ONDANSETRON 4 MG: 4 TABLET, ORALLY DISINTEGRATING ORAL at 01:00

## 2022-06-11 RX ADMIN — ENOXAPARIN SODIUM 40 MG: 40 INJECTION SUBCUTANEOUS at 05:37

## 2022-06-11 RX ADMIN — ACETAMINOPHEN 650 MG: 325 TABLET ORAL at 01:24

## 2022-06-11 RX ADMIN — SERTRALINE 50 MG: 50 TABLET, FILM COATED ORAL at 11:56

## 2022-06-11 RX ADMIN — DOCUSATE SODIUM 50 MG AND SENNOSIDES 8.6 MG 2 TABLET: 8.6; 5 TABLET, FILM COATED ORAL at 17:17

## 2022-06-11 ASSESSMENT — LIFESTYLE VARIABLES
EVER FELT BAD OR GUILTY ABOUT YOUR DRINKING: NO
HOW MANY TIMES IN THE PAST YEAR HAVE YOU HAD 5 OR MORE DRINKS IN A DAY: 0
TOTAL SCORE: 0
HAVE YOU EVER FELT YOU SHOULD CUT DOWN ON YOUR DRINKING: NO
AVERAGE NUMBER OF DAYS PER WEEK YOU HAVE A DRINK CONTAINING ALCOHOL: 0
CONSUMPTION TOTAL: NEGATIVE
ON A TYPICAL DAY WHEN YOU DRINK ALCOHOL HOW MANY DRINKS DO YOU HAVE: 0
DO YOU DRINK ALCOHOL: NO
HAVE PEOPLE ANNOYED YOU BY CRITICIZING YOUR DRINKING: NO
TOTAL SCORE: 0
TOTAL SCORE: 0
ALCOHOL_USE: NO
EVER HAD A DRINK FIRST THING IN THE MORNING TO STEADY YOUR NERVES TO GET RID OF A HANGOVER: NO

## 2022-06-11 ASSESSMENT — PATIENT HEALTH QUESTIONNAIRE - PHQ9
SUM OF ALL RESPONSES TO PHQ QUESTIONS 1-9: 5
SUM OF ALL RESPONSES TO PHQ9 QUESTIONS 1 AND 2: 2
5. POOR APPETITE OR OVEREATING: SEVERAL DAYS
3. TROUBLE FALLING OR STAYING ASLEEP OR SLEEPING TOO MUCH: NOT AT ALL
6. FEELING BAD ABOUT YOURSELF - OR THAT YOU ARE A FAILURE OR HAVE LET YOURSELF OR YOUR FAMILY DOWN: NOT AL ALL
9. THOUGHTS THAT YOU WOULD BE BETTER OFF DEAD, OR OF HURTING YOURSELF: NOT AT ALL
2. FEELING DOWN, DEPRESSED, IRRITABLE, OR HOPELESS: SEVERAL DAYS
7. TROUBLE CONCENTRATING ON THINGS, SUCH AS READING THE NEWSPAPER OR WATCHING TELEVISION: SEVERAL DAYS
4. FEELING TIRED OR HAVING LITTLE ENERGY: SEVERAL DAYS
8. MOVING OR SPEAKING SO SLOWLY THAT OTHER PEOPLE COULD HAVE NOTICED. OR THE OPPOSITE, BEING SO FIGETY OR RESTLESS THAT YOU HAVE BEEN MOVING AROUND A LOT MORE THAN USUAL: NOT AT ALL
1. LITTLE INTEREST OR PLEASURE IN DOING THINGS: SEVERAL DAYS

## 2022-06-11 ASSESSMENT — ENCOUNTER SYMPTOMS
DOUBLE VISION: 0
DEPRESSION: 0
HEADACHES: 0
CHILLS: 0
VOMITING: 0
BRUISES/BLEEDS EASILY: 0
WEAKNESS: 0
BLURRED VISION: 0
NECK PAIN: 0
COUGH: 0
INSOMNIA: 0
CONSTIPATION: 0
DIARRHEA: 0
BLOOD IN STOOL: 0
FOCAL WEAKNESS: 0
BACK PAIN: 0
WHEEZING: 0
LOSS OF CONSCIOUSNESS: 0
HEARTBURN: 0
NAUSEA: 0
SORE THROAT: 0
FEVER: 0
PALPITATIONS: 0
SHORTNESS OF BREATH: 1
ABDOMINAL PAIN: 0

## 2022-06-11 ASSESSMENT — FIBROSIS 4 INDEX
FIB4 SCORE: 1.33
FIB4 SCORE: 1.33

## 2022-06-11 ASSESSMENT — PAIN DESCRIPTION - PAIN TYPE
TYPE: ACUTE PAIN
TYPE: ACUTE PAIN

## 2022-06-11 NOTE — CARE PLAN
Problem: Pain - Standard  Goal: Alleviation of pain or a reduction in pain to the patient’s comfort goal  Outcome: Progressing     Problem: Knowledge Deficit - Standard  Goal: Patient and family/care givers will demonstrate understanding of plan of care, disease process/condition, diagnostic tests and medications  Outcome: Progressing     Problem: Fall Risk  Goal: Patient will remain free from falls  Outcome: Progressing   The patient is Watcher - Medium risk of patient condition declining or worsening    Shift Goals  Clinical Goals: improve Na, prevent seizures  Patient Goals: sleep  Family Goals: na.    Progress made toward(s) clinical / shift goals:  corrected Na, seizure precautions    Patient is not progressing towards the following goals:

## 2022-06-11 NOTE — ASSESSMENT & PLAN NOTE
Hypertonic saline initiated in ED and discontinued by myself as her symptoms of seizure-like activity unlikely with normal CPK, prolactin, lactic acid.  Her levels of hyponatremia do not seem significant enough to cause seizure-like activity and we will obtain serial BMP and treat with normal saline infusion at low infusion rate at this time for her intravascular depletion and compounded dehydration.

## 2022-06-11 NOTE — PROGRESS NOTES
Patient admitted this morning by Dr. Almanzar please refer to his H&P.  She reports that she currently resides at the women's [facility].  She had seizure-like activity and was transferred to the emergency department when she was noted to have a sodium of 122.  She received hypertonic saline in the emergency department which has since been stopped and she was started on normal saline.    On my evaluation the patient is alert and oriented x4.  She thinks she was dehydrated from had riding her bike in the heat.  She reports that she drinks 8 16 ounce bottles of water in a day.  She reports taking her medications as prescribed but does not believe that she has bipolar disorder and is questioning whether she needs to stay on her psychiatric medications.  She denies any changes of vision at the time of my examination.  CT of the head reviewed was negative for acute pathology.  Labs not consistent with recent seizure and do not have any details about her seizure episode.    Repeat patient's sodium was 133 we will discontinue normal saline we will give her 500 mL of D5W and started on D5W infusion.  Goal sodium over the next 24 hours is 128-130  Reviewed with patient restricting free fluids to 1500 mL and obtaining hydration from electrolyte rich solutions.  She is not aware of any recent outpatient lab work she denies any prior history of hyponatremia  Continue close monitoring of sodium levels.  Discussed with pharmacist and nursing staff      Addendum:  Repeat Na 135 will give 1L D5w over 2 hours and recheck Na in 4 hours. Discussed with pharmacy and nursing staff

## 2022-06-11 NOTE — CONSULTS
Consults     32-year-old female with significant psychiatric history, including ADHD on Adderall.  Presented from group home where had to episodes of possible seizure and confusion, no tongue biting urinary incontinence (and lactate was within normal limits)  On presented to ED patient's sodium was 122, when evaluating patient corroborating story looking at labs, unclear if patient truly had a seizure as would expect lactic acidosis prolactin elevation and CK elevation and all were normal.      Unclear chronicity of patient's sodium, 122 while low is not usually low enough to cause seizures and less hyperacute decrease in sodium levels.  Do feel with patient's mental status changes that initial bolus with hypertonic saline is appropriate, however when evaluating labs patient also very likely dehydrated(also noted ketones without glucose in urine ?  Starvation ketosis).     Discussed with hospitalist feel patient appropriate for [facility] admission for close monitoring of sodium, and monitoring for signs of seizure (if recurs should get repeat on those labs to confirm seizure and possible EEG) .

## 2022-06-11 NOTE — PROGRESS NOTES
Med rec completed per pt's home pharmacies ([facility] and [facility])   Unknown last doses of medications taken

## 2022-06-11 NOTE — ASSESSMENT & PLAN NOTE
Subjective and negative on work-up with labs with CPK, lactic acid and prolactin.  Seizure precautions  Ativan 4 mg IV every 5 minutes if seizure-like activity  If positive for seizure-like activity consider neurologic consultation and video EEG with monitoring.

## 2022-06-11 NOTE — PROGRESS NOTES
"Patient transported to floor from ed. Patient A&Ox4 but forgetful/agitated but not angry: \"flighty\". Plan of care discussed with patient.  Patient oriented to floor and surroundings. Patient currently on room air. VSS except some HOTN. Patient is not expressing any complaints of pain at this time. Tele box placed. Monitor room notified. 2 rn skin check performed. Patient educated to call for assistance before ambulating. Patient education regarding fall risk. Call light within reach. Fall precautions in place.      "

## 2022-06-11 NOTE — HOSPITAL COURSE
32-year-old female with history of bipolar disorder and ADHD presented to the emergency department for evaluation of seizure-like activity noted to have hyponatremia.  She received hypertonic saline in the emergency department and admitted to [facility].

## 2022-06-11 NOTE — ED NOTES
Pt yelling in room, rn asked pt what was wrong pt states her head is hurting and needs something for pain.

## 2022-06-11 NOTE — H&P
Hospital Medicine History & Physical Note    Date of Service  6/11/2022    Primary Care Physician  Pcp Pt States None    Consultants  Intensivist: Dr. Garay    Code Status  Full Code    Chief Complaint  Chief Complaint   Patient presents with   • Seizure       History of Presenting Illness  Anika Woody is a 32 y.o. female who presented 6/10/2022 with subjective seizure-like activity and coming from group home.  She states she does not use any illicit drugs or alcohol and takes Adderall.  She states complaining of significant headache without neck stiffness and has been having some nausea and vomiting associated with this.  EMS brought patient into the hospital for further evaluation.  She states she has had poor oral intake and has not been eating much due to her nausea and vomiting.  Otherwise vaccinated for COVID-19 and denies signs and symptoms of COVID-19.  Denies any hematemesis or blood in stool.  Also denies anosmia, ageusia, shortness of breath, cough with sputum production, incoordination, vertigo, paresthesias but does admit to visual changes with diplopia and blurred vision.  She did complain of shortness of breath and chest pain and D-dimer was tested for for concern for PE.    Vital signs at time of presentation were as follows: 97.5, 74, 16, 127/68, 96% room air.    CT head performed and no acute intracranial abnormalities noted.  Chest x-ray performed and no acute cardiopulmonary disease.    CBC reveals leukopenia of 3.6 otherwise relatively unremarkable without signs of infection.  Urinalysis reveals specific gravity elevated 1.021 and ketosis of 80 likely starvation ketosis.    Diagnostic alcohol level within normal limits, lactic acid tested and within normal limits as well as CPK, prolactin and D-dimer.  UDS positive for amphetamines and she does admit to using Adderall which is prescribed to her.  Urine sodium elevated and she does have moderate to severe hyponatremia of 122 on CMP as well  as anion gap metabolic acidosis with anion gap of 18 and CO2 of 17.    Hospitalist consulted for admission.  Intensivist consulted for admission.  Patient initiated on hypertonic saline which has been discontinued as her symptoms are likely not due to her hyponatremia.  She has significant photophobia and nausea and vomiting while at bedside.  She has no neck tenderness and likely will require lumbar puncture for evaluation of opening pressure and potentiall ophthalmology consultation for her blurred vision and possible increased intracranial pressure.  She will be optimized in ED and subsequently transferred to [facility] for further optimization medical management by daytime [facility] hospitalist.    I discussed the plan of care with patient.    Review of Systems  Review of Systems   Constitutional: Negative for chills and fever.   HENT: Negative for congestion and sore throat.    Eyes: Negative for blurred vision and double vision.   Respiratory: Positive for shortness of breath. Negative for cough and wheezing.    Cardiovascular: Positive for chest pain. Negative for palpitations.   Gastrointestinal: Negative for abdominal pain, blood in stool, constipation, diarrhea, heartburn, melena, nausea and vomiting.   Genitourinary: Negative for dysuria and frequency.   Musculoskeletal: Negative for back pain and neck pain.   Skin: Negative for itching and rash.   Neurological: Negative for focal weakness, loss of consciousness, weakness and headaches.   Endo/Heme/Allergies: Negative for environmental allergies. Does not bruise/bleed easily.   Psychiatric/Behavioral: Negative for depression. The patient does not have insomnia.        Past Medical History   has no past medical history on file.    Surgical History   has a past surgical history that includes tonsillectomy (6/2007) and mammoplasty augmentation (5/10/2011).     Family History  family history includes Cancer in an other family member; Hypertension in an other family  member.   Family history reviewed with patient. There is no family history that is pertinent to the chief complaint.     Social History   reports that she has never smoked. She has never used smokeless tobacco. She reports current alcohol use. She reports current drug use. Drug: Oral.    Allergies  No Known Allergies    Medications  None       Physical Exam  Temp:  [36.4 °C (97.5 °F)] 36.4 °C (97.5 °F)  Pulse:  [68-97] 72  Resp:  [16-18] 18  BP: ()/(52-68) 101/57  SpO2:  [93 %-100 %] 94 %  Blood Pressure: 101/57   Temperature: 36.4 °C (97.5 °F)   Pulse: 72   Respiration: 18   Pulse Oximetry: 94 %       Physical Exam  Constitutional:       General: She is in acute distress.      Appearance: Normal appearance.      Comments: Somnolent however gently awoken and patient immediately started vomiting moderate distress,, pleasant and cooperative with physical examination   HENT:      Head: Normocephalic and atraumatic.      Mouth/Throat:      Mouth: Mucous membranes are dry.      Pharynx: Oropharynx is clear. No posterior oropharyngeal erythema.   Eyes:      General: No scleral icterus.     Extraocular Movements: Extraocular movements intact.      Conjunctiva/sclera: Conjunctivae normal.      Pupils: Pupils are equal, round, and reactive to light.   Neck:      Vascular: No carotid bruit.   Cardiovascular:      Rate and Rhythm: Normal rate and regular rhythm.      Pulses: Normal pulses.      Heart sounds: Normal heart sounds. No murmur heard.    No friction rub. No gallop.   Pulmonary:      Effort: Pulmonary effort is normal.      Breath sounds: Normal breath sounds. No wheezing, rhonchi or rales.   Abdominal:      General: Abdomen is flat. There is no distension.      Palpations: There is no mass.      Tenderness: There is no abdominal tenderness. There is no rebound.      Comments: Hyperactive bowel sounds x4 quadrants   Musculoskeletal:         General: No swelling. Normal range of motion.      Cervical back:  Normal range of motion.      Right lower leg: No edema.      Left lower leg: No edema.   Lymphadenopathy:      Cervical: No cervical adenopathy.   Skin:     General: Skin is warm and dry.      Capillary Refill: Capillary refill takes less than 2 seconds.      Findings: No erythema or rash.   Neurological:      General: No focal deficit present.      Mental Status: She is alert and oriented to person, place, and time. Mental status is at baseline.      Cranial Nerves: No cranial nerve deficit.      Sensory: No sensory deficit.      Motor: No weakness.   Psychiatric:         Mood and Affect: Mood normal.         Behavior: Behavior normal.         Laboratory:  Recent Labs     06/11/22 0046   WBC 3.6*   RBC 4.57   HEMOGLOBIN 14.3   HEMATOCRIT 39.7   MCV 86.9   MCH 31.3   MCHC 36.0*   RDW 38.9   PLATELETCT 230   MPV 9.9     Recent Labs     06/11/22 0046   SODIUM 122*   POTASSIUM 3.6   CHLORIDE 87*   CO2 17*   GLUCOSE 88   BUN 13   CREATININE 0.60   CALCIUM 8.0*     Recent Labs     06/11/22 0046   ALTSGPT 24   ASTSGOT 47*   ALKPHOSPHAT 54   TBILIRUBIN 0.9   GLUCOSE 88         No results for input(s): NTPROBNP in the last 72 hours.      No results for input(s): TROPONINT in the last 72 hours.    Imaging:  DX-CHEST-PORTABLE (1 VIEW)   Final Result         No acute cardiac or pulmonary abnormality is identified.      CT-HEAD W/O   Final Result         NO ACUTE ABNORMALITIES ARE NOTED ON CT SCAN OF THE HEAD.                     Assessment/Plan:  Justification for Admission Status  I anticipate this patient will require at least two midnights for appropriate medical management, necessitating inpatient admission because Of electrolyte abnormalities needing correction prior to discharge.    * Hyponatremia- (present on admission)  Assessment & Plan  Hypertonic saline initiated in ED and discontinued by myself as her symptoms of seizure-like activity unlikely with normal CPK, prolactin, lactic acid.  Her levels of hyponatremia  do not seem significant enough to cause seizure-like activity and we will obtain serial BMP and treat with normal saline infusion at low infusion rate at this time for her intravascular depletion and compounded dehydration.      Seizure-like activity (HCC)- (present on admission)  Assessment & Plan  Subjective and negative on work-up with labs with CPK, lactic acid and prolactin.  Seizure precautions  Ativan 4 mg IV every 5 minutes if seizure-like activity  If positive for seizure-like activity consider neurologic consultation and video EEG with monitoring.    Nausea & vomiting- (present on admission)  Assessment & Plan  QTC within normal limits on twelve-lead EKG  Aspiration precautions  Antiemetics ordered  No vertical or horizontal nystagmus, no positional vertigo and negative Timoteo-Hallpike on examination.      Headache- (present on admission)  Assessment & Plan  With patient's complaint of headache, blurred vision, photophobia without stiff neck or meningismus signs, we will recommend lumbar puncture to be performed for opening pressure evaluation to rule out pseudotumor cerebri.  Fioricet for headache  Analgesics for pain control      VTE prophylaxis: enoxaparin ppx     I spent greater than 40 minutes of critical care time evaluating and treating patient excluding any procedures.

## 2022-06-11 NOTE — ED NOTES
Pt had an episode of emesis, c/o headache and asking for pain medication. Pt cleaned and new sheets and gown placed on pt. Pt on bedpan and urine obtained and sent to lab. Blood obtained and sent to lab.

## 2022-06-11 NOTE — ED NOTES
"Chief Complaint   Patient presents with   • Seizure       BIB EMS to RD 06, pt on monitor and in gown, labs drawn and sent. Pt consists of: seizure like activity x 2 at [facility]. Per EMS second seziure was anywhere from 30 seconds to 5 minutes. Post ictal on scene.   Pt is currently oriented to person, place and time, not situation. Pt denies hx of seizures and denies being on seizure medication.   C/o frontal HA.     Medications given en route: NA  FSBG 101 en route     Ht 1.575 m (5' 2\")   Wt 51.3 kg (113 lb)   BMI 20.67 kg/m²   "

## 2022-06-11 NOTE — ED PROVIDER NOTES
ED Provider Note    CHIEF COMPLAINT  Chief Complaint   Patient presents with   • Seizure       HPI  Anika Woody is a 32 y.o. female who presents from a group home after having at least 2 episodes which were described as seizures which were anywhere from 30 seconds to 5 minutes, per report from EMS.  Patient does not have a history of seizures.  She notes that she has had a headache for about 5 hours now and does not remember the episodes.  She feels very tired.  She notes she does not use drugs or alcohol and only use the medications that she is on for her chronic medical issues which includes schizoaffective disorder and bipolar disorder.  She is on hydroxyzine, Invega, Zoloft, Topamax, and Lysteda.    REVIEW OF SYSTEMS  Constitutional: No recent fevers or chills  Skin: No rashes, abrasions, lacerations  HEENT: No sore throat, runny nose, sores, trouble swallowing, trouble speaking.  No double vision or blurry vision.  Patient did not bite tongue  Neck: No neck pain, stiffness, or masses.  Chest: No pain or rashes  Pulm: No shortness of breath, cough, wheezing, stridor, or pain with inspiration/expiration  Gastrointestinal: No nausea, vomiting, diarrhea, constipation, bloating, melena, hematochezia or abdominal pain.  Genitourinary: No dysuria or hematuria.  Did not lose bowel or bladder control  Musculoskeletal: No recent trauma, pain, swelling, weakness  Neurologic: No numbness, tingling, or focal motor weakness.  Amnestic to the episodes but no current confusion.  Psych: Nonsuicidal   Heme: No bleeding or bruising problems.   Immuno: No hx of recurrent infections    PAST FAM HISTORY  Family History   Problem Relation Age of Onset   • Hypertension Other    • Cancer Other        PAST MEDICAL HISTORY   Schizoaffective disorder and bipolar disorder    SOCIAL HISTORY  Social History     Tobacco Use   • Smoking status: Never Smoker   • Smokeless tobacco: Never Used   Substance and Sexual Activity   • Alcohol  "use: Yes     Comment: once a week   • Drug use: Yes     Types: Oral     Comment: edible   • Sexual activity: Not on file       SURGICAL HISTORY   has a past surgical history that includes tonsillectomy (6/2007) and mammoplasty augmentation (5/10/2011).    CURRENT MEDICATIONS  Home Medications    **Home medications have not yet been reviewed for this encounter**         ALLERGIES  No Known Allergies    PHYSICAL EXAM  VITAL SIGNS: BP (!) 95/52   Pulse 72   Temp 36.4 °C (97.5 °F) (Axillary)   Resp 18   Ht 1.575 m (5' 2\")   Wt 51.3 kg (113 lb)   SpO2 93%   BMI 20.67 kg/m²    Gen: Appears tired, otherwise no apparent distress.  Lying left lateral recumbent with knees drawn up.  Speaks softly with eyes closed, but opens them to command.  HEENT: No signs of trauma, Bilateral external ears normal, Nose normal. Conjunctiva normal, Non-icteric.  Pupils appear equal  Neck:  No tenderness, Supple, No masses  Lymphatic: No cervical lymphadenopathy noted.   Cardiovascular: Regular rate and rhythm, no murmurs.  Capillary refill less than 3 seconds to all extremities, 2+ distal pulses.  Thorax & Lungs: Normal breath sounds, No respiratory distress, No wheezing bilateral chest rise  Abdomen: Bowel sounds normal, Soft, No tenderness, No masses, No pulsatile masses. No Guarding or rebound  Skin: Warm, Dry, No erythema, No rash noted to exposed areas.   Back: No bony tenderness, No CVA tenderness.   Extremities: Intact distal pulses, No edema  Neurologic: Appears tired but able to demonstrate orientation to person, place, time, and situation, no facial droop, grossly normal coordination and strength  Psychiatric: Affect somewhat withdrawn      LABS  Results for orders placed or performed during the hospital encounter of 06/10/22   CBC WITH DIFFERENTIAL   Result Value Ref Range    WBC 3.6 (L) 4.8 - 10.8 K/uL    RBC 4.57 4.20 - 5.40 M/uL    Hemoglobin 14.3 12.0 - 16.0 g/dL    Hematocrit 39.7 37.0 - 47.0 %    MCV 86.9 81.4 - 97.8 " fL    MCH 31.3 27.0 - 33.0 pg    MCHC 36.0 (H) 33.6 - 35.0 g/dL    RDW 38.9 35.9 - 50.0 fL    Platelet Count 230 164 - 446 K/uL    MPV 9.9 9.0 - 12.9 fL    Neutrophils-Polys 63.00 44.00 - 72.00 %    Lymphocytes 26.80 22.00 - 41.00 %    Monocytes 9.60 0.00 - 13.40 %    Eosinophils 0.00 0.00 - 6.90 %    Basophils 0.30 0.00 - 1.80 %    Immature Granulocytes 0.30 0.00 - 0.90 %    Nucleated RBC 0.00 /100 WBC    Neutrophils (Absolute) 2.24 2.00 - 7.15 K/uL    Lymphs (Absolute) 0.95 (L) 1.00 - 4.80 K/uL    Monos (Absolute) 0.34 0.00 - 0.85 K/uL    Eos (Absolute) 0.00 0.00 - 0.51 K/uL    Baso (Absolute) 0.01 0.00 - 0.12 K/uL    Immature Granulocytes (abs) 0.01 0.00 - 0.11 K/uL    NRBC (Absolute) 0.00 K/uL   COMP METABOLIC PANEL   Result Value Ref Range    Sodium 122 (L) 135 - 145 mmol/L    Potassium 3.6 3.6 - 5.5 mmol/L    Chloride 87 (L) 96 - 112 mmol/L    Co2 17 (L) 20 - 33 mmol/L    Anion Gap 18.0 (H) 7.0 - 16.0    Glucose 88 65 - 99 mg/dL    Bun 13 8 - 22 mg/dL    Creatinine 0.60 0.50 - 1.40 mg/dL    Calcium 8.0 (L) 8.5 - 10.5 mg/dL    AST(SGOT) 47 (H) 12 - 45 U/L    ALT(SGPT) 24 2 - 50 U/L    Alkaline Phosphatase 54 30 - 99 U/L    Total Bilirubin 0.9 0.1 - 1.5 mg/dL    Albumin 4.3 3.2 - 4.9 g/dL    Total Protein 6.4 6.0 - 8.2 g/dL    Globulin 2.1 1.9 - 3.5 g/dL    A-G Ratio 2.0 g/dL   LACTIC ACID   Result Value Ref Range    Lactic Acid 1.9 0.5 - 2.0 mmol/L   URINALYSIS (UA)    Specimen: Urine   Result Value Ref Range    Color Yellow     Character Clear     Specific Gravity 1.021 <1.035    Ph 5.0 5.0 - 8.0    Glucose Negative Negative mg/dL    Ketones 80 (A) Negative mg/dL    Protein Negative Negative mg/dL    Bilirubin Negative Negative    Urobilinogen, Urine 0.2 Negative    Nitrite Negative Negative    Leukocyte Esterase Negative Negative    Occult Blood Negative Negative    Micro Urine Req see below    URINE DRUG SCREEN   Result Value Ref Range    Amphetamines Urine Positive (A) Negative    Barbiturates Negative  Negative    Benzodiazepines Negative Negative    Cocaine Metabolite Negative Negative    Methadone Negative Negative    Opiates Negative Negative    Oxycodone Negative Negative    Phencyclidine -Pcp Negative Negative    Propoxyphene Negative Negative    Cannabinoid Metab Negative Negative   DIAGNOSTIC ALCOHOL   Result Value Ref Range    Diagnostic Alcohol <10.1 <10.1 mg/dL   HCG QUAL SERUM   Result Value Ref Range    Beta-Hcg Qualitative Serum Negative Negative   ESTIMATED GFR   Result Value Ref Range    GFR (CKD-EPI) 122 >60 mL/min/1.73 m 2   OSMOLALITY URINE   Result Value Ref Range    Osmolality Urine 652 300 - 900 mOsm/kg H2O   URINE SODIUM RANDOM   Result Value Ref Range    Sodium, Urine -per volume 61 mmol/L   PROLACTIN   Result Value Ref Range    Prolactin 20.80 2.80 - 26.00 ng/mL   D-DIMER   Result Value Ref Range    D-Dimer Screen 0.44 0.00 - 0.50 ug/mL (FEU)   MAGNESIUM   Result Value Ref Range    Magnesium 1.9 1.5 - 2.5 mg/dL   PHOSPHORUS   Result Value Ref Range    Phosphorus 2.7 2.5 - 4.5 mg/dL   EKG (NOW)   Result Value Ref Range    Report       [facility] Emergency Dept.    Test Date:  2022  Pt Name:    AARTI SABILLON                Department: ER  MRN:        [id number]                      Room:       [facility]  Gender:     Female                       Technician: [id number]  :        1990                   Requested By:DYAN REESE  Order #:    [id number]                    Reading MD:    Measurements  Intervals                                Axis  Rate:       73                           P:          47  IA:         156                          QRS:        93  QRSD:       88                           T:          94  QT:         424  QTc:        468    Interpretive Statements  SINUS RHYTHM  BORDERLINE RIGHT AXIS DEVIATION  NONSPECIFIC T ABNORMALITIES, LATERAL LEADS  No previous ECG available for comparison         RADIOLOGY  DX-CHEST-PORTABLE (1 VIEW)   Final Result          No acute cardiac or pulmonary abnormality is identified.      CT-HEAD W/O   Final Result         NO ACUTE ABNORMALITIES ARE NOTED ON CT SCAN OF THE HEAD.                 Critical Care Note  Upon my evaluation, this patient had high probability of imminent and life-threatening deterioration due to hyponatremia with seizures, which required my direct attention, intervention, and personal management. I personally provided 35 minutes of critical care time exclusive of time spent on separately billable procedures. Time includes review of laboratory data, radiology results, discussion with consultants, and monitoring for potential decompensation.     HYDRATION: Based on the patient's presentation of Acute Vomiting the patient was given IV fluids. IV Hydration was used because oral hydration was not adequate alone. Upon recheck following hydration, the patient was unchanged.    COURSE & MEDICAL DECISION MAKING  Patient has for evaluation after apparently experiencing 2 discrete episodes which were thought to be seizure events.  Patient has no known history of this but was found to be postictal by EMS.  Patient arrives appearing tired but having no other findings to suggest a recent seizure event including lack of tongue laceration and no loss of bowel or bladder control.  She denies any recent alcohol or drug use but notes she does take her psychiatric medications.  She is complaining of a headache and I will need to CT her head for space-occupying lesion.  Laboratory evaluation will be undertaken as well.  She will be watched closely for any signs of deterioration or seizure.    Patient's labs are concerning for hyponatremia which, in the setting of a possible recent seizure, could be related.  She is also noted to have amphetamines in her urine drug screen which is consistent with her psychiatric medications.  These things combined could have caused the seizure event.  Again, the patient has no elevation in her lactate  but I will discussed the case with the pharmacist and the intensivist.  If we believe this to be an actual seizure event we will need to start 3% saline.    After discussion with pharmacist and the intensivist, we will give a bolus of 3% saline however it is still unclear what the actual episodes were.  Patient was then discussed with the hospitalist who will admit to the IMCU as the patient was felt to be stable enough for this unit versus the ICU.  Patient did not experience any deterioration during this time.    FINAL IMPRESSION  1. Hyponatremia    2. Seizure-like activity (HCC)        Electronically signed by: Pancho Ayala M.D., 6/11/2022 12:12 AM

## 2022-06-11 NOTE — ASSESSMENT & PLAN NOTE
With patient's complaint of headache, blurred vision, photophobia without stiff neck or meningismus signs, we will recommend lumbar puncture to be performed for opening pressure evaluation to rule out pseudotumor cerebri.  Fioricet for headache  Analgesics for pain control

## 2022-06-11 NOTE — PROGRESS NOTES
4 Eyes Skin Assessment Completed by Glen RN and HUANG Viveros.    Head WDL  Ears WDL  Nose WDL  Mouth WDL  Neck WDL  Breast/Chest WDL  Shoulder Blades WDL  Spine WDL  (R) Arm/Elbow/Hand WDL  (L) Arm/Elbow/Hand WDL  Abdomen WDL  Groin WDL  Scrotum/Coccyx/Buttocks WDL  (R) Leg Blanching, Scab and Bruising on R hip(nonblanching)  (L) Leg Redness, Blanching, Scab and Bruising  (R) Heel/Foot/Toe WDL  (L) Heel/Foot/Toe WDL          Devices In Places ECG, Blood Pressure Cuff and Pulse Ox      Interventions In Place Pressure Redistribution Mattress, Pillows    Possible Skin Injury No    Pictures Uploaded Into Epic N/A  Wound Consult Placed N/A  RN Wound Prevention Protocol Ordered No

## 2022-06-11 NOTE — ASSESSMENT & PLAN NOTE
QTC within normal limits on twelve-lead EKG  Aspiration precautions  Antiemetics ordered  No vertical or horizontal nystagmus, no positional vertigo and negative Timoteo-Hallpike on examination.

## 2022-06-12 VITALS
HEIGHT: 62 IN | BODY MASS INDEX: 23.49 KG/M2 | SYSTOLIC BLOOD PRESSURE: 94 MMHG | TEMPERATURE: 98.1 F | WEIGHT: 127.65 LBS | HEART RATE: 78 BPM | RESPIRATION RATE: 15 BRPM | DIASTOLIC BLOOD PRESSURE: 52 MMHG | OXYGEN SATURATION: 96 %

## 2022-06-12 PROBLEM — R11.2 NAUSEA & VOMITING: Status: RESOLVED | Noted: 2022-06-11 | Resolved: 2022-06-12

## 2022-06-12 PROBLEM — H53.8 BLURRED VISION: Status: RESOLVED | Noted: 2022-06-11 | Resolved: 2022-06-12

## 2022-06-12 PROBLEM — F31.9 BIPOLAR AFFECTIVE (HCC): Status: ACTIVE | Noted: 2022-06-12

## 2022-06-12 PROBLEM — R51.9 HEADACHE: Status: RESOLVED | Noted: 2022-06-11 | Resolved: 2022-06-12

## 2022-06-12 PROBLEM — E87.1 HYPONATREMIA: Status: RESOLVED | Noted: 2022-06-11 | Resolved: 2022-06-12

## 2022-06-12 PROBLEM — R56.9 SEIZURE-LIKE ACTIVITY (HCC): Status: RESOLVED | Noted: 2022-06-11 | Resolved: 2022-06-12

## 2022-06-12 LAB
ANION GAP SERPL CALC-SCNC: 9 MMOL/L (ref 7–16)
ANION GAP SERPL CALC-SCNC: 9 MMOL/L (ref 7–16)
BASOPHILS # BLD AUTO: 0.3 % (ref 0–1.8)
BASOPHILS # BLD: 0.01 K/UL (ref 0–0.12)
BUN SERPL-MCNC: 5 MG/DL (ref 8–22)
BUN SERPL-MCNC: 7 MG/DL (ref 8–22)
CALCIUM SERPL-MCNC: 7.7 MG/DL (ref 8.5–10.5)
CALCIUM SERPL-MCNC: 8 MG/DL (ref 8.5–10.5)
CHLORIDE SERPL-SCNC: 104 MMOL/L (ref 96–112)
CHLORIDE SERPL-SCNC: 106 MMOL/L (ref 96–112)
CO2 SERPL-SCNC: 22 MMOL/L (ref 20–33)
CO2 SERPL-SCNC: 22 MMOL/L (ref 20–33)
CORTIS SERPL-MCNC: 130 UG/DL (ref 0–23)
CORTIS SERPL-MCNC: 2.1 UG/DL (ref 0–23)
CREAT SERPL-MCNC: 0.6 MG/DL (ref 0.5–1.4)
CREAT SERPL-MCNC: 0.66 MG/DL (ref 0.5–1.4)
EOSINOPHIL # BLD AUTO: 0.07 K/UL (ref 0–0.51)
EOSINOPHIL NFR BLD: 2.3 % (ref 0–6.9)
ERYTHROCYTE [DISTWIDTH] IN BLOOD BY AUTOMATED COUNT: 43.3 FL (ref 35.9–50)
GFR SERPLBLD CREATININE-BSD FMLA CKD-EPI: 119 ML/MIN/1.73 M 2
GFR SERPLBLD CREATININE-BSD FMLA CKD-EPI: 122 ML/MIN/1.73 M 2
GLUCOSE SERPL-MCNC: 130 MG/DL (ref 65–99)
GLUCOSE SERPL-MCNC: 161 MG/DL (ref 65–99)
HCT VFR BLD AUTO: 39.8 % (ref 37–47)
HGB BLD-MCNC: 14.1 G/DL (ref 12–16)
IMM GRANULOCYTES # BLD AUTO: 0.02 K/UL (ref 0–0.11)
IMM GRANULOCYTES NFR BLD AUTO: 0.7 % (ref 0–0.9)
LYMPHOCYTES # BLD AUTO: 0.94 K/UL (ref 1–4.8)
LYMPHOCYTES NFR BLD: 31.1 % (ref 22–41)
MAGNESIUM SERPL-MCNC: 1.9 MG/DL (ref 1.5–2.5)
MCH RBC QN AUTO: 32 PG (ref 27–33)
MCHC RBC AUTO-ENTMCNC: 35.4 G/DL (ref 33.6–35)
MCV RBC AUTO: 90.2 FL (ref 81.4–97.8)
MONOCYTES # BLD AUTO: 0.44 K/UL (ref 0–0.85)
MONOCYTES NFR BLD AUTO: 14.6 % (ref 0–13.4)
NEUTROPHILS # BLD AUTO: 1.54 K/UL (ref 2–7.15)
NEUTROPHILS NFR BLD: 51 % (ref 44–72)
NRBC # BLD AUTO: 0 K/UL
NRBC BLD-RTO: 0 /100 WBC
PLATELET # BLD AUTO: 229 K/UL (ref 164–446)
PMV BLD AUTO: 10.5 FL (ref 9–12.9)
POTASSIUM SERPL-SCNC: 3.8 MMOL/L (ref 3.6–5.5)
POTASSIUM SERPL-SCNC: 4.1 MMOL/L (ref 3.6–5.5)
RBC # BLD AUTO: 4.41 M/UL (ref 4.2–5.4)
SODIUM SERPL-SCNC: 135 MMOL/L (ref 135–145)
SODIUM SERPL-SCNC: 137 MMOL/L (ref 135–145)
T4 FREE SERPL-MCNC: 1.29 NG/DL (ref 0.93–1.7)
TSH SERPL DL<=0.005 MIU/L-ACNC: 0.98 UIU/ML (ref 0.38–5.33)
WBC # BLD AUTO: 3 K/UL (ref 4.8–10.8)

## 2022-06-12 PROCEDURE — 84439 ASSAY OF FREE THYROXINE: CPT

## 2022-06-12 PROCEDURE — 83735 ASSAY OF MAGNESIUM: CPT

## 2022-06-12 PROCEDURE — 700102 HCHG RX REV CODE 250 W/ 637 OVERRIDE(OP): Performed by: STUDENT IN AN ORGANIZED HEALTH CARE EDUCATION/TRAINING PROGRAM

## 2022-06-12 PROCEDURE — 82533 TOTAL CORTISOL: CPT | Mod: 91

## 2022-06-12 PROCEDURE — A9270 NON-COVERED ITEM OR SERVICE: HCPCS | Performed by: STUDENT IN AN ORGANIZED HEALTH CARE EDUCATION/TRAINING PROGRAM

## 2022-06-12 PROCEDURE — 700111 HCHG RX REV CODE 636 W/ 250 OVERRIDE (IP): Performed by: STUDENT IN AN ORGANIZED HEALTH CARE EDUCATION/TRAINING PROGRAM

## 2022-06-12 PROCEDURE — 700102 HCHG RX REV CODE 250 W/ 637 OVERRIDE(OP): Performed by: HOSPITALIST

## 2022-06-12 PROCEDURE — 85025 COMPLETE CBC W/AUTO DIFF WBC: CPT

## 2022-06-12 PROCEDURE — 84443 ASSAY THYROID STIM HORMONE: CPT

## 2022-06-12 PROCEDURE — 99239 HOSP IP/OBS DSCHRG MGMT >30: CPT | Performed by: HOSPITALIST

## 2022-06-12 PROCEDURE — 700105 HCHG RX REV CODE 258: Performed by: STUDENT IN AN ORGANIZED HEALTH CARE EDUCATION/TRAINING PROGRAM

## 2022-06-12 PROCEDURE — 82024 ASSAY OF ACTH: CPT

## 2022-06-12 PROCEDURE — 80048 BASIC METABOLIC PNL TOTAL CA: CPT | Mod: 91

## 2022-06-12 PROCEDURE — 700105 HCHG RX REV CODE 258: Performed by: HOSPITALIST

## 2022-06-12 PROCEDURE — A9270 NON-COVERED ITEM OR SERVICE: HCPCS | Performed by: HOSPITALIST

## 2022-06-12 RX ORDER — COSYNTROPIN 0.25 MG/ML
250 INJECTION, POWDER, FOR SOLUTION INTRAMUSCULAR; INTRAVENOUS ONCE
Status: DISCONTINUED | OUTPATIENT
Start: 2022-06-12 | End: 2022-06-12

## 2022-06-12 RX ADMIN — POTASSIUM CHLORIDE 40 MEQ: 1500 TABLET, EXTENDED RELEASE ORAL at 01:10

## 2022-06-12 RX ADMIN — DEXTROSE MONOHYDRATE: 50 INJECTION, SOLUTION INTRAVENOUS at 06:47

## 2022-06-12 RX ADMIN — DEXTROSE MONOHYDRATE: 50 INJECTION, SOLUTION INTRAVENOUS at 07:11

## 2022-06-12 RX ADMIN — RISPERIDONE 2 MG: 1 TABLET ORAL at 05:30

## 2022-06-12 RX ADMIN — SERTRALINE 50 MG: 50 TABLET, FILM COATED ORAL at 05:30

## 2022-06-12 RX ADMIN — DEXTROSE MONOHYDRATE: 50 INJECTION, SOLUTION INTRAVENOUS at 01:11

## 2022-06-12 RX ADMIN — HYDROCORTISONE SODIUM SUCCINATE 100 MG: 100 INJECTION, POWDER, FOR SOLUTION INTRAMUSCULAR; INTRAVENOUS at 05:30

## 2022-06-12 RX ADMIN — DOCUSATE SODIUM 50 MG AND SENNOSIDES 8.6 MG 2 TABLET: 8.6; 5 TABLET, FILM COATED ORAL at 05:30

## 2022-06-12 RX ADMIN — TOPIRAMATE 75 MG: 25 TABLET, FILM COATED ORAL at 05:30

## 2022-06-12 RX ADMIN — BUPROPION HYDROCHLORIDE 150 MG: 150 TABLET, EXTENDED RELEASE ORAL at 07:08

## 2022-06-12 RX ADMIN — DEXTROAMPHETAMINE SACCHARATE, AMPHETAMINE ASPARTATE, DEXTROAMPHETAMINE SULFATE, AMPHETAMINE SULFATE TABLETS, 10 MG,CLL 20 MG: 2.5; 2.5; 2.5; 2.5 TABLET ORAL at 05:30

## 2022-06-12 ASSESSMENT — PAIN DESCRIPTION - PAIN TYPE
TYPE: ACUTE PAIN
TYPE: ACUTE PAIN

## 2022-06-12 NOTE — PROGRESS NOTES
I was called to evaluate patient at bedside for severe symptomatic hypotension with a BP of 85/50 and the map of 61.  She initially presented with altered mental status, found to be hyponatremic at 122 and hypokalemic.  Sodium was corrected with saline but was overshot from 122-133 so she was started on D5W infusion.  Sodium then went up to 135 and went down to 133 and D5W was stopped.    Exam:  General: Sleepy  HEENT: Dry oral mucosa  Respiratory: CTAB  Cardiac: RRR, no murmur  Neuro: Alert person and place but not to time.  Patient somnolent, needs to be asked question multiple times to awaken and answer    Will resume D5W infusion at a faster rate for both overcorrection of hyponatremia as well as hypotension she is intravascular depleted.  Continue to monitor labs every 4 hours.  Replete potassium.  Hyponatremia work-up labs show urine sodium elevated at 61 with very high urine osmolality at 652.  This is consistent with SIADH versus hypothyroidism versus adrenal insufficiency.    Will check thyroid labs and a.m. cortisol.    Patient is critically ill.   The patient continues to have: Symptomatic hypotension and hyponatremia  If untreated there is a high chance of deterioration And eventually death.   The critical care that I am providing today is: Evaluation, work-up, treatment  The critical that has been undertaken is medically complex.   There has been no overlap in critical care time.   Critical Care Time not including procedures: 50

## 2022-06-12 NOTE — DISCHARGE PLANNING
Received Transport Form @ 0918  Spoke to Monie @ [facility]    Transport is scheduled for 6/12/22 @1200 going to [facility]  [address] [place], [place] [address], [place]  [address]    [employer] will pick the pt up at 1200 at the [facility] entrance.    [facility] Trip #[id number]    MARIANELA Kapoor notified of transport time via Teams and voalte.

## 2022-06-12 NOTE — DISCHARGE PLANNING
Case Management Discharge Planning    Admission Date: 6/10/2022  GMLOS: 2.6  ALOS: 1    6-Clicks ADL Score:    6-Clicks Mobility Score:        Anticipated Discharge Dispo:  D/c back to  today    DME Needed: No    Action(s) Taken: OTHER    Escalations Completed: None    Medically Clear: Yes    Next Steps: Lsw attended rounds. MD indicates pt needs a PCP at d/c. Lsw provided Medicaid clinic list and HOPEs information.     Pt states she will acquire a PCP this Wednesday coming up. The  has a few MDs who visit residents on site.    Barriers to Discharge: None    Is the patient up for discharge tomorrow: No

## 2022-06-12 NOTE — CARE PLAN
The patient is Stable - Low risk of patient condition declining or worsening    Shift Goals  Clinical Goals: Monitor sodium level, stable neuro exam  Patient Goals: Sleep  Family Goals: NA    Progress made toward(s) clinical / shift goals:    Problem: Pain - Standard  Goal: Alleviation of pain or a reduction in pain to the patient’s comfort goal  Outcome: Progressing     Problem: Knowledge Deficit - Standard  Goal: Patient and family/care givers will demonstrate understanding of plan of care, disease process/condition, diagnostic tests and medications  Outcome: Progressing     Problem: Fall Risk  Goal: Patient will remain free from falls  Outcome: Progressing       Patient is not progressing towards the following goals:

## 2022-06-12 NOTE — DISCHARGE INSTRUCTIONS
Follow up with PCP with repeat lab work in 1 week  Restrict water intake to 1.5 quarts a dayDischarge Instructions    Discharged to {DISCHARGE TO:046033} by {TRANSPORTATION:465322} with {WIITH:721864}. Discharged via {DISCHARGED VIA:433912}, hospital escort: {HOSPITAL ESCORT:243341}.  Special equipment needed: {SPECIAL EQUIPMENT:319205}    Be sure to schedule a follow-up appointment with your primary care doctor or any specialists as instructed.     Discharge Plan:        I understand that a diet low in cholesterol, fat, and sodium is recommended for good health. Unless I have been given specific instructions below for another diet, I accept this instruction as my diet prescription.   Other diet: ***    Special Instructions: {BODY SYSTEMS:06897}    Is patient discharged on Warfarin / Coumadin?   {WARFARIN YES/NO?:059385}    Depression / Suicide Risk    As you are discharged from this [facility] facility, it is important to learn how to keep safe from harming yourself.    Recognize the warning signs:  Abrupt changes in personality, positive or negative- including increase in energy   Giving away possessions  Change in eating patterns- significant weight changes-  positive or negative  Change in sleeping patterns- unable to sleep or sleeping all the time   Unwillingness or inability to communicate  Depression  Unusual sadness, discouragement and loneliness  Talk of wanting to die  Neglect of personal appearance   Rebelliousness- reckless behavior  Withdrawal from people/activities they love  Confusion- inability to concentrate     If you or a loved one observes any of these behaviors or has concerns about self-harm, here's what you can do:  Talk about it- your feelings and reasons for harming yourself  Remove any means that you might use to hurt yourself (examples: pills, rope, extension cords, firearm)  Get professional help from the community (Mental Health, Substance Abuse, psychological counseling)  Do not be  alone:Call your Safe Contact- someone whom you trust who will be there for you.  Call your local CRISIS HOTLINE 814-6360 or 741-133-4666  Call your local Children's Mobile Crisis Response Team [place] (953) 695-1651 or www.[employer]rics  Call the toll free National Suicide Prevention Hotlines   National Suicide Prevention Lifeline 742-116-LZHU (6278)  [place] [facility] Line Network 800-SUICIDE (278-9787)

## 2022-06-12 NOTE — PROGRESS NOTES
Sodium= 133 from 135. Dr. Sterling notified. Instructed to stop D5 drip, recheck with next BMP @ 0000.

## 2022-06-12 NOTE — DISCHARGE SUMMARY
Discharge Summary    CHIEF COMPLAINT ON ADMISSION  Chief Complaint   Patient presents with   • Seizure       Reason for Admission  ems     Admission Date  6/10/2022    CODE STATUS  Full Code    HPI & HOSPITAL COURSE    32-year-old female with history of bipolar disorder and ADHD presented to the emergency department for evaluation of seizure-like activity noted to have hyponatremia.  She received hypertonic saline in the emergency department and admitted to [facility].  Her initial sodium was 122 however her repeat sodium levels were 133.  At the time of my initial evaluation the patient was completely asymptomatic.  She was given D5W fluid boluses and started on D5W infusion to avoid further increase in her sodium levels.  Her repeat sodiums remained in the mid 130s.  She had a systolic blood pressure in the 90s however her map was greater than 60.  On my examination this morning she feels back to her baseline and is asking to be discharged home.  She denies any headache nausea or vomiting.  She denies any lightheadedness.  The on call physician had concerns about possible adrenal sufficiency checked a random cortisol level which was 2 and she received 1 dose of hydrocortisone 100 mg.  The patient has no other symptoms of adrenal insufficiency.  I doubt that this was the etiology of her initial low sodium as it should not have corrected without any interventions for her adrenal insufficiency.  The patient reported that she drinks about a gallon of water every day to keep yourself hydrated and this might have been a contributor to her presenting hyponatremia.  I recommended she restrict her free fluids to 1500 mL and discussed obtaining more hydration with electrolyte rich solutions.  The patient is set up to establish with a new PCP that will [facility].  I recommended that she has repeat chemistry panel and consideration for ACTH stimulation test as outpatient.  Unfortunately  with her recent dose of hydrocortisone obtaining cosyntropin stim test might not be accurate.  The patient is anxious to be discharged and she is clinically stable for discharge she is tolerating her diet and has tolerated ambulation without any lightheadedness or dizziness reviewed with her the importance to establish with PCP and obtain follow-up labs.        Therefore, she is discharged in good and stable condition to home with close outpatient follow-up.    The patient met 2-midnight criteria for an inpatient stay at the time of discharge.    Discharge Date  6/12/2022    FOLLOW UP ITEMS POST DISCHARGE  Recheck chemistry panel on Wednesday with PCP  Consider cosyntropin stim test    DISCHARGE DIAGNOSES  Principal Problem (Resolved):    Hyponatremia POA: Yes  Active Problems:    Bipolar affective (HCC) POA: Unknown  Resolved Problems:    Headache POA: Yes    Nausea & vomiting POA: Yes    Blurred vision POA: Yes    Seizure-like activity (HCC) POA: Yes      FOLLOW UP  No future appointments.  [facility]  [address]  [place] [address]  Go in 1 week(s)  Follow up labs post-discharge (ACTH, cortisol)      MEDICATIONS ON DISCHARGE     Medication List      CONTINUE taking these medications      Instructions   amphetamine-dextroamphetamine 20 MG Tabs  Commonly known as: ADDERALL   Take 20 mg by mouth 2 times a day.  Dose: 20 mg     buPROPion  MG Tb12 sustained-release tablet  Commonly known as: WELLBUTRIN-SR   Take 150 mg by mouth every day.  Dose: 150 mg     risperiDONE 2 MG Tabs  Commonly known as: RISPERDAL   Take 2 mg by mouth 2 times a day.  Dose: 2 mg     sertraline 50 MG Tabs  Commonly known as: Zoloft   Take 50 mg by mouth every day.  Dose: 50 mg     topiramate 50 MG tablet  Commonly known as: TOPAMAX   Take 25-75 mg by mouth in the morning, at noon, and at bedtime. 75 mg in the AM  75 mg in the PM  25 mg at bedtime  Dose: 25-75 mg            Allergies  No Known  Allergies    DIET  Orders Placed This Encounter   Procedures   • Diet Order Diet: Regular     Standing Status:   Standing     Number of Occurrences:   1     Order Specific Question:   Diet:     Answer:   Regular [1]       ACTIVITY  As tolerated.  Weight bearing as tolerated    CONSULTATIONS    Critical care    PROCEDURES  none    LABORATORY  Lab Results   Component Value Date    SODIUM 137 06/12/2022    POTASSIUM 4.1 06/12/2022    CHLORIDE 106 06/12/2022    CO2 22 06/12/2022    GLUCOSE 130 (H) 06/12/2022    BUN 5 (L) 06/12/2022    CREATININE 0.66 06/12/2022        Lab Results   Component Value Date    WBC 3.0 (L) 06/12/2022    HEMOGLOBIN 14.1 06/12/2022    HEMATOCRIT 39.8 06/12/2022    PLATELETCT 229 06/12/2022        Total time of the discharge process exceeds 35 minutes.

## 2022-06-12 NOTE — DISCHARGE PLANNING
Case Management Discharge Planning    Admission Date: 6/10/2022  GMLOS: 2.6  ALOS: 1    6-Clicks ADL Score:    6-Clicks Mobility Score:        Anticipated Discharge Dispo:  D/c home w/o any needs    DME Needed: No    Action(s) Taken: OTHER    Escalations Completed: None    Medically Clear: Yes    Next Steps: Lsw spoke to RN then pt at bedside. Pt gave verbal permission for Lsw to call  and set up transport.    Lsw called pt's [facility] at phone [phone]. They do not have transport on weekends. Pt has a bed, and  will advise the management of pt's returning today medically cleared and stable. They would like a copy of her d/c paperwork.     Lsw completed ITF for MTM and faxed to DPA for tx set up. Lsw sent M Teams to [facility] to advise of pending IFT for d/c today.    Barriers to Discharge: None    Is the patient up for discharge tomorrow: No

## 2022-06-14 LAB — ACTH PLAS-MCNC: <1.5 PG/ML (ref 7.2–63.3)

## 2023-01-16 ENCOUNTER — HOSPITAL ENCOUNTER (EMERGENCY)
Facility: MEDICAL CENTER | Age: 33
End: 2023-01-16
Attending: EMERGENCY MEDICINE
Payer: COMMERCIAL

## 2023-01-16 VITALS
OXYGEN SATURATION: 100 % | DIASTOLIC BLOOD PRESSURE: 62 MMHG | SYSTOLIC BLOOD PRESSURE: 118 MMHG | RESPIRATION RATE: 16 BRPM | TEMPERATURE: 97.9 F | HEART RATE: 68 BPM

## 2023-01-16 DIAGNOSIS — R45.851 SUICIDAL IDEATION: ICD-10-CM

## 2023-01-16 LAB — POC BREATHALIZER: 0 PERCENT (ref 0–0.01)

## 2023-01-16 PROCEDURE — 302970 POC BREATHALIZER

## 2023-01-16 PROCEDURE — 99285 EMERGENCY DEPT VISIT HI MDM: CPT

## 2023-01-16 PROCEDURE — 90791 PSYCH DIAGNOSTIC EVALUATION: CPT

## 2023-01-16 PROCEDURE — 302970 POC BREATHALIZER: Performed by: EMERGENCY MEDICINE

## 2023-01-16 NOTE — ED PROVIDER NOTES
ED Provider Note    CHIEF COMPLAINT  Chief Complaint   Patient presents with    Suicidal Ideation       EXTERNAL RECORDS REVIEWED  Outpatient Notes      HPI/ROS  LIMITATION TO HISTORY   Select: : None  OUTSIDE HISTORIAN(S):       Anika Woody is a 32 y.o. female with hx of bipolar disorder and ADHD who presents with reports of suicidal ideation.  Denies suicidal ideation at this time however.  Denies alcohol or drug abuse.  Denies any medical complaints at this time such as chest pain, trouble breathing, nausea, vomiting, fevers.      History is rather difficult to obtain at this time due to the patient not being very forthcoming with information. She is followed by a psychiatrist and reports compliance with medications.  Last seen by psychiatrist about a month ago and reports she is due for another appointment soon.        PAST MEDICAL HISTORY       SURGICAL HISTORY   has a past surgical history that includes tonsillectomy (6/2007) and mammoplasty augmentation (5/10/2011).    FAMILY HISTORY  Family History   Problem Relation Age of Onset    Hypertension Other     Cancer Other        SOCIAL HISTORY  Social History     Tobacco Use    Smoking status: Never    Smokeless tobacco: Never   Substance and Sexual Activity    Alcohol use: Yes     Comment: once a week    Drug use: Yes     Types: Oral     Comment: edible    Sexual activity: Not on file       CURRENT MEDICATIONS  Home Medications       Reviewed by Farhana Davis R.N. (Registered Nurse) on 01/16/23 at 1507  Med List Status: <None>     Medication Last Dose Status   amphetamine-dextroamphetamine (ADDERALL) 20 MG Tab  Active   buPROPion SR (WELLBUTRIN-SR) 150 MG TABLET SR 12 HR sustained-release tablet  Active   risperiDONE (RISPERDAL) 2 MG Tab  Active   sertraline (ZOLOFT) 50 MG Tab  Active   topiramate (TOPAMAX) 50 MG tablet  Active                    ALLERGIES  No Known Allergies    PHYSICAL EXAM  VITAL SIGNS: /56   Pulse 69   Temp 36.9 °C (98.5 °F)  (Temporal)   Resp 18   SpO2 100%    Pulse ox interpretation: I interpret this pulse ox as normal.  Constitutional: Alert in no apparent distress.  HENT: Normocephalic, Atraumatic, Bilateral external ears normal. Nose normal.   Eyes: Conjunctiva normal, non-icteric.   Heart: Regular rate and rhythm.    Lungs: No respiratory distress, normal breath sounds  Skin: Warm, Dry, No erythema, No rash.   Neurologic: Alert, Grossly non-focal.   Psychiatric: Denies suicidal ideation., Appears appropriate and not intoxicated.       DIAGNOSTIC STUDIES / PROCEDURES    LABS  Labs Reviewed   POC BREATHALIZER - Normal       COURSE & MEDICAL DECISION MAKING    ED Observation Status? No; Patient does not meet criteria for ED Observation.     INITIAL ASSESSMENT AND PLAN  Care Narrative: 32 y.o. female presenting with prior suicidal ideation however denies any such thoughts now.  The patient denies any active medical complaints either.      Denies attempting at overdosing or performing any self harm.  Evaluated by life skills and patient appears to continue to be not suicidal.  Pt was cleared from legal hold and appears safe for discharge.      ADDITIONAL PROBLEM LIST AND DISPOSITION    Problem #1: Suicidal thoughts    I have discussed management of the patient with the following physicians and TRUE's:      Discussion of management with other QHP or appropriate source(s): Behavioral Health        Escalation of care considered, and ultimately not performed:the patient was evaluated by Behavioral Health, after discussion I have recommended the patient to be discharged and blood analysis    Barriers to care at this time, including but not limited to: Patient lacks financial resources.     Decision tools and prescription drugs considered including, but not limited to:    .    HTN/IDDM FOLLOW UP:  The patient is referred to a primary physician for blood pressure management, diabetic screening, and for all other preventive health concerns         FINAL DIAGNOSIS  1. Suicidal ideation               Electronically signed by: Leonid Shirley M.D., 1/16/2023 3:36 PM

## 2023-01-16 NOTE — ED TRIAGE NOTES
Patient comes in by police from snf, arrested and released from snf on a legal hold for SI, no plan in place, patient is calm, she has not been taking her medications at home.  Legal hold was placed by the snf.
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
## 2023-01-17 NOTE — ED NOTES
Items returned to patient, to include money, clothes and papers from the [facility].  Bus pass, food and extra clothing for comfort measures provided.

## 2024-01-11 ENCOUNTER — HOSPITAL ENCOUNTER (EMERGENCY)
Facility: MEDICAL CENTER | Age: 34
End: 2024-01-11
Attending: EMERGENCY MEDICINE
Payer: MEDICAID

## 2024-01-11 ENCOUNTER — HOSPITAL ENCOUNTER (EMERGENCY)
Facility: MEDICAL CENTER | Age: 34
End: 2024-01-12
Attending: STUDENT IN AN ORGANIZED HEALTH CARE EDUCATION/TRAINING PROGRAM
Payer: COMMERCIAL

## 2024-01-11 VITALS
BODY MASS INDEX: 21.79 KG/M2 | DIASTOLIC BLOOD PRESSURE: 75 MMHG | SYSTOLIC BLOOD PRESSURE: 125 MMHG | HEART RATE: 102 BPM | RESPIRATION RATE: 17 BRPM | HEIGHT: 62 IN | TEMPERATURE: 98 F | OXYGEN SATURATION: 98 % | WEIGHT: 118.39 LBS

## 2024-01-11 DIAGNOSIS — F22 PARANOIA (PSYCHOSIS) (HCC): ICD-10-CM

## 2024-01-11 DIAGNOSIS — R45.851 SUICIDAL IDEATION: ICD-10-CM

## 2024-01-11 DIAGNOSIS — F22 DELUSIONS (HCC): ICD-10-CM
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
## 2024-01-11 LAB
POC BREATHALIZER: 0 PERCENT (ref 0–0.01)
POC BREATHALIZER: 0 PERCENT (ref 0–0.01)

## 2024-01-11 PROCEDURE — A9270 NON-COVERED ITEM OR SERVICE: HCPCS | Mod: UD | Performed by: EMERGENCY MEDICINE

## 2024-01-11 PROCEDURE — 302970 POC BREATHALIZER

## 2024-01-11 PROCEDURE — 80307 DRUG TEST PRSMV CHEM ANLYZR: CPT

## 2024-01-11 PROCEDURE — 90791 PSYCH DIAGNOSTIC EVALUATION: CPT

## 2024-01-11 PROCEDURE — 99285 EMERGENCY DEPT VISIT HI MDM: CPT

## 2024-01-11 PROCEDURE — 99283 EMERGENCY DEPT VISIT LOW MDM: CPT

## 2024-01-11 PROCEDURE — 700102 HCHG RX REV CODE 250 W/ 637 OVERRIDE(OP): Mod: UD | Performed by: EMERGENCY MEDICINE

## 2024-01-11 PROCEDURE — 302970 POC BREATHALIZER: Performed by: EMERGENCY MEDICINE

## 2024-01-11 PROCEDURE — 302970 POC BREATHALIZER: Performed by: STUDENT IN AN ORGANIZED HEALTH CARE EDUCATION/TRAINING PROGRAM

## 2024-01-11 RX ORDER — RISPERIDONE 1 MG/1
1 TABLET ORAL 2 TIMES DAILY
Status: DISCONTINUED | OUTPATIENT
Start: 2024-01-11 | End: 2024-01-11 | Stop reason: HOSPADM

## 2024-01-11 RX ADMIN — RISPERIDONE 1 MG: 1 TABLET ORAL at 09:09

## 2024-01-11 ASSESSMENT — FIBROSIS 4 INDEX
FIB4 SCORE: 1.38
FIB4 SCORE: 1.38

## 2024-01-11 NOTE — ED NOTES
"Called pt from trang , found pt in restroom, checked if she is okay . Pt said \" I am taking a dump, I will be out when I am done\".   "

## 2024-01-11 NOTE — ED NOTES
Bedside report received from off going RN/tech: Chen ENCINAS, assumed care of patient.  POC discussed with patient. Call light within reach, all needs addressed at this time.       Fall risk interventions in place: Place fall risk sign on patient's door (all applicable per [facility] Fall risk assessment)   Continuous monitoring: Not Applicable   IVF/IV medications: Not Applicable   Oxygen: Room Air  Bedside sitter: Not Applicable   Isolation: Not Applicable

## 2024-01-11 NOTE — ED NOTES
..Pt verbalizes understanding of dc instructions provided. Pt states that all questions have been answered and they feel comfortable with discharge instructions provided. Pt has dc paperwork in hand. Pt has all belongings in possession. Pt to lobby w/o incident.
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
## 2024-01-12 VITALS
HEIGHT: 62 IN | SYSTOLIC BLOOD PRESSURE: 128 MMHG | DIASTOLIC BLOOD PRESSURE: 61 MMHG | RESPIRATION RATE: 18 BRPM | HEART RATE: 71 BPM | OXYGEN SATURATION: 97 % | WEIGHT: 118.39 LBS | TEMPERATURE: 97 F | BODY MASS INDEX: 21.79 KG/M2

## 2024-01-12 LAB
AMPHET UR QL SCN: NEGATIVE
BARBITURATES UR QL SCN: NEGATIVE
BENZODIAZ UR QL SCN: NEGATIVE
BZE UR QL SCN: NEGATIVE
CANNABINOIDS UR QL SCN: NEGATIVE
FENTANYL UR QL: NEGATIVE
METHADONE UR QL SCN: NEGATIVE
OPIATES UR QL SCN: NEGATIVE
OXYCODONE UR QL SCN: NEGATIVE
PCP UR QL SCN: NEGATIVE
PROPOXYPH UR QL SCN: NEGATIVE

## 2024-01-12 PROCEDURE — A9270 NON-COVERED ITEM OR SERVICE: HCPCS | Mod: UD | Performed by: EMERGENCY MEDICINE

## 2024-01-12 PROCEDURE — 700102 HCHG RX REV CODE 250 W/ 637 OVERRIDE(OP): Mod: UD | Performed by: EMERGENCY MEDICINE

## 2024-01-12 RX ORDER — RISPERIDONE 1 MG/1
1 TABLET ORAL 2 TIMES DAILY
Status: DISCONTINUED | OUTPATIENT
Start: 2024-01-12 | End: 2024-01-12 | Stop reason: HOSPADM

## 2024-01-12 RX ADMIN — RISPERIDONE 1 MG: 1 TABLET ORAL at 06:23
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
## 2024-01-12 NOTE — ED NOTES
Patient ambulated to the bathroom independently with steady gait accompanied by this RN.  Urine sample collected and sent.  Patient provided with ice water, tolerated well.  Patient now resting on right side, resp even and unlabored, NAD.  Patient continues on 1:1 observation with sitter in LOS, room safety complete, checklist in place, and STOP sign posted outside door.

## 2024-01-12 NOTE — ED NOTES
Patient resting comfortably, resp even and unlabored, NAD, and no needs at this time.  Patient continues on 1:1 observation with sitter in LOS, room safety complete, checklist in place, and STOP sign posted outside door. l

## 2024-01-12 NOTE — ED NOTES
Security called for belongings search.  Patient resting comfortably, resp even and unlabored, NAD, and no needs at this time.  Patient continues on 1:1 observation with sitter in LOS, room safety complete, checklist in place, and STOP sign posted outside door.

## 2024-01-12 NOTE — ED NOTES
Patient medicated per MAR, tolerated well.  Patient belongings, 2 bags, placed in locker 15 after being searched by security.

## 2024-01-12 NOTE — ED NOTES
Pt resting in gurney, equal chest rise and fall, no signs of distress. 1:1 sitter in direct view of pt

## 2024-01-12 NOTE — ED NOTES
Med rec complete per patient  Allergies reviewed.   Patient denies any outpatient antibiotics in the last 30 days.   Anticoagulants taken in the last 14 days? No    Patients preferred [facility]: [facility]

## 2024-01-12 NOTE — ED NOTES
Updated pt on expected transfer. Pt denies any needs at this time. 1:1 sitter in direct view of pt

## 2024-01-12 NOTE — DISCHARGE PLANNING
Legal Hold Transfer     Referral: Legal hold transfer to Mental Health Facility     Intervention: Notified by Alert Team HUANG Esquivel that pt has been accepted to [facility].     [facility]'s [facility]     Transport arranged through [facility]     The pt will be picked up at 1000      Notified Bedside RN Sully, Alert Team HUANG Fuentes, and Dr. Emanuel of the departure time as well as accepting facility.      Transfer packet and COBRA created with original legal hold and placed on chart by Alert Team RN.     Plan: Pt will be transferring to [facility] today at 1000 via [facility].

## 2024-01-12 NOTE — ED NOTES
Patient placed on legal hold at this time. Pending eval by Alert Team.  Patient resting comfortably, resp even and unlabored, NAD, and no needs at this time.  Patient started on 1:1 observation with sitter in LOS, room safety complete, checklist in place, and STOP sign posted outside door.

## 2024-01-12 NOTE — ED NOTES
Received report from Shila ENCINAS. Assumed pt care, pt resting in gurney, equal chest rise and fall. 1:1 sitter in direct view of pt

## 2024-01-12 NOTE — DISCHARGE PLANNING
Alert Team:    Pt has been accepted by RADHA Lewis at [facility]; requesting transport at 1000 to be arranged via [facility] by behavioral health team. Updated RN and ERP.

## 2024-01-12 NOTE — ED NOTES
USHA here to take pt to [facility]. [facility] provided with pts transfer packet and 2 belongings bags. VS obtained. [facility] notified of pt being transported there and report given to Evita at [facility]

## 2024-01-12 NOTE — ED NOTES
Report to Naida, RN  Patient resting comfortably, resp even and unlabored, NAD, and no needs at this time.  Patient continues on 1:1 observation with sitter in LOS, room safety complete, checklist in place, and STOP sign posted outside door.

## 2024-01-12 NOTE — DISCHARGE SUMMARY
"  ED Observation Discharge Summary    Patient:Anika Woody  Patient : 1990  Patient MRN: [id number]  Patient PCP: Pcp Pt States None    Admit Date: 2024  Discharge Date and Time: 24 7:59 AM  Discharge Diagnosis:   1. Suicidal ideation    2. Delusions (HCC)    3. Paranoia (psychosis) (HCC)        Discharge Attending: Palomo Emanuel M.D.  Discharge Service: ED Observation    ED Course  Anika is a 33 y.o. female who was evaluated at [facility] for bizzare behavior and suicidal thoughts.  Patient with significant disorganized thought process on presentation.  Patient was medically cleared by my partner.  Once psychiatric facility was available patient was discharged to their care.    Discharge Exam:  /81   Pulse 88   Temp 36.8 °C (98.3 °F) (Temporal)   Resp 16   Ht 1.575 m (5' 2\")   Wt 53.7 kg (118 lb 6.2 oz)   SpO2 98%   BMI 21.65 kg/m² .    Constitutional: Awake and alert. Nontoxic  HENT:  Grossly normal  Eyes: Grossly normal  Neck: Normal range of motion  Cardiovascular: Normal heart rate   Thorax & Lungs: No respiratory distress  Abdomen: Nontender  Skin:  No pathologic rash.   Extremities: Well perfused  Psychiatric: Affect normal    Labs  Results for orders placed or performed during the hospital encounter of 24   Urine Drug Screen   Result Value Ref Range    Amphetamines Urine Negative Negative    Barbiturates Negative Negative    Benzodiazepines Negative Negative    Cocaine Metabolite Negative Negative    Fentanyl, Urine Negative Negative    Methadone Negative Negative    Opiates Negative Negative    Oxycodone Negative Negative    Phencyclidine -Pcp Negative Negative    Propoxyphene Negative Negative    Cannabinoid Metab Negative Negative   POC BREATHALIZER   Result Value Ref Range    POC Breathalizer 0.00 0.00 - 0.01 Percent       Radiology  No orders to display       Medications:   New Prescriptions    No medications on file       My final assessment includes   1. " Suicidal ideation    2. Delusions (HCC)    3. Paranoia (psychosis) (HCC)      Upon Reevaluation, the patient's condition has: not improved; and will be escalated to hospitalization.  In psychiatric facility    Patient discharged from ED Observation status at 1000 (Time) 01/12/24 (Date).     Total time spent on this ED Observation discharge encounter is < 30 Minutes    Electronically signed by: Palomo Emanuel M.D., 1/12/2024 7:59 AM
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
## 2024-04-13 ENCOUNTER — HOSPITAL ENCOUNTER (EMERGENCY)
Facility: MEDICAL CENTER | Age: 34
End: 2024-04-15
Attending: EMERGENCY MEDICINE
Payer: MEDICAID

## 2024-04-13 DIAGNOSIS — F23 ACUTE PSYCHOSIS (HCC): ICD-10-CM

## 2024-04-13 DIAGNOSIS — R41.82 ALTERED MENTAL STATUS, UNSPECIFIED ALTERED MENTAL STATUS TYPE: ICD-10-CM

## 2024-04-13 LAB
ALBUMIN SERPL BCP-MCNC: 4 G/DL (ref 3.2–4.9)
ALBUMIN/GLOB SERPL: 1.5 G/DL
ALP SERPL-CCNC: 55 U/L (ref 30–99)
ALT SERPL-CCNC: 15 U/L (ref 2–50)
ANION GAP SERPL CALC-SCNC: 18 MMOL/L (ref 7–16)
AST SERPL-CCNC: 28 U/L (ref 12–45)
BASOPHILS # BLD AUTO: 0.4 % (ref 0–1.8)
BASOPHILS # BLD: 0.05 K/UL (ref 0–0.12)
BILIRUB SERPL-MCNC: 0.5 MG/DL (ref 0.1–1.5)
BUN SERPL-MCNC: 15 MG/DL (ref 8–22)
CALCIUM ALBUM COR SERPL-MCNC: 8.7 MG/DL (ref 8.5–10.5)
CALCIUM SERPL-MCNC: 8.7 MG/DL (ref 8.4–10.2)
CHLORIDE SERPL-SCNC: 104 MMOL/L (ref 96–112)
CO2 SERPL-SCNC: 19 MMOL/L (ref 20–33)
CREAT SERPL-MCNC: 0.7 MG/DL (ref 0.5–1.4)
EOSINOPHIL # BLD AUTO: 0.05 K/UL (ref 0–0.51)
EOSINOPHIL NFR BLD: 0.4 % (ref 0–6.9)
ERYTHROCYTE [DISTWIDTH] IN BLOOD BY AUTOMATED COUNT: 41 FL (ref 35.9–50)
ETHANOL BLD-MCNC: <10.1 MG/DL
GFR SERPLBLD CREATININE-BSD FMLA CKD-EPI: 116 ML/MIN/1.73 M 2
GLOBULIN SER CALC-MCNC: 2.6 G/DL (ref 1.9–3.5)
GLUCOSE SERPL-MCNC: 65 MG/DL (ref 65–99)
HCG SERPL QL: NEGATIVE
HCT VFR BLD AUTO: 43.2 % (ref 37–47)
HGB BLD-MCNC: 14.4 G/DL (ref 12–16)
IMM GRANULOCYTES # BLD AUTO: 0.05 K/UL (ref 0–0.11)
IMM GRANULOCYTES NFR BLD AUTO: 0.4 % (ref 0–0.9)
LYMPHOCYTES # BLD AUTO: 1.97 K/UL (ref 1–4.8)
LYMPHOCYTES NFR BLD: 16.2 % (ref 22–41)
MCH RBC QN AUTO: 31 PG (ref 27–33)
MCHC RBC AUTO-ENTMCNC: 33.3 G/DL (ref 32.2–35.5)
MCV RBC AUTO: 92.9 FL (ref 81.4–97.8)
MONOCYTES # BLD AUTO: 0.89 K/UL (ref 0–0.85)
MONOCYTES NFR BLD AUTO: 7.3 % (ref 0–13.4)
NEUTROPHILS # BLD AUTO: 9.13 K/UL (ref 1.82–7.42)
NEUTROPHILS NFR BLD: 75.3 % (ref 44–72)
NRBC # BLD AUTO: 0 K/UL
NRBC BLD-RTO: 0 /100 WBC (ref 0–0.2)
PLATELET # BLD AUTO: 266 K/UL (ref 164–446)
PMV BLD AUTO: 10.2 FL (ref 9–12.9)
POTASSIUM SERPL-SCNC: 3.8 MMOL/L (ref 3.6–5.5)
PROT SERPL-MCNC: 6.6 G/DL (ref 6–8.2)
RBC # BLD AUTO: 4.65 M/UL (ref 4.2–5.4)
SODIUM SERPL-SCNC: 141 MMOL/L (ref 135–145)
WBC # BLD AUTO: 12.1 K/UL (ref 4.8–10.8)

## 2024-04-13 PROCEDURE — 87389 HIV-1 AG W/HIV-1&-2 AB AG IA: CPT

## 2024-04-13 PROCEDURE — 84703 CHORIONIC GONADOTROPIN ASSAY: CPT

## 2024-04-13 PROCEDURE — 85025 COMPLETE CBC W/AUTO DIFF WBC: CPT

## 2024-04-13 PROCEDURE — 700111 HCHG RX REV CODE 636 W/ 250 OVERRIDE (IP): Mod: JZ | Performed by: EMERGENCY MEDICINE

## 2024-04-13 PROCEDURE — 86780 TREPONEMA PALLIDUM: CPT

## 2024-04-13 PROCEDURE — 99285 EMERGENCY DEPT VISIT HI MDM: CPT

## 2024-04-13 PROCEDURE — 80053 COMPREHEN METABOLIC PANEL: CPT

## 2024-04-13 PROCEDURE — 96372 THER/PROPH/DIAG INJ SC/IM: CPT

## 2024-04-13 PROCEDURE — 82077 ASSAY SPEC XCP UR&BREATH IA: CPT

## 2024-04-13 PROCEDURE — 36415 COLL VENOUS BLD VENIPUNCTURE: CPT

## 2024-04-13 RX ORDER — LORAZEPAM 2 MG/ML
2 INJECTION INTRAMUSCULAR ONCE
Status: COMPLETED | OUTPATIENT
Start: 2024-04-13 | End: 2024-04-13

## 2024-04-13 RX ORDER — HALOPERIDOL 5 MG/ML
5 INJECTION INTRAMUSCULAR ONCE
Status: COMPLETED | OUTPATIENT
Start: 2024-04-13 | End: 2024-04-13

## 2024-04-13 RX ORDER — DIPHENHYDRAMINE HYDROCHLORIDE 50 MG/ML
50 INJECTION INTRAMUSCULAR; INTRAVENOUS ONCE
Status: COMPLETED | OUTPATIENT
Start: 2024-04-13 | End: 2024-04-13

## 2024-04-13 RX ADMIN — DIPHENHYDRAMINE HYDROCHLORIDE 50 MG: 50 INJECTION, SOLUTION INTRAMUSCULAR; INTRAVENOUS at 21:35

## 2024-04-13 RX ADMIN — HALOPERIDOL LACTATE 5 MG: 5 INJECTION, SOLUTION INTRAMUSCULAR at 21:35

## 2024-04-13 RX ADMIN — LORAZEPAM 2 MG: 2 INJECTION INTRAMUSCULAR; INTRAVENOUS at 21:45

## 2024-04-13 ASSESSMENT — FIBROSIS 4 INDEX: FIB4 SCORE: 1.38

## 2024-04-14 LAB
GLUCOSE BLD STRIP.AUTO-MCNC: 52 MG/DL (ref 65–99)
HIV 1+2 AB+HIV1 P24 AG SERPL QL IA: NORMAL
T PALLIDUM AB SER QL IA: NORMAL

## 2024-04-14 PROCEDURE — 82962 GLUCOSE BLOOD TEST: CPT

## 2024-04-14 RX ORDER — ZOLPIDEM TARTRATE 10 MG/1
10 TABLET ORAL
COMMUNITY

## 2024-04-14 RX ORDER — RISPERIDONE 1 MG/1
1 TABLET ORAL EVERY MORNING
Status: DISCONTINUED | OUTPATIENT
Start: 2024-04-14 | End: 2024-04-15 | Stop reason: HOSPADM

## 2024-04-14 RX ORDER — RISPERIDONE 1 MG/1
2 TABLET ORAL EVERY EVENING
Status: DISCONTINUED | OUTPATIENT
Start: 2024-04-14 | End: 2024-04-15 | Stop reason: HOSPADM

## 2024-04-14 RX ORDER — DEXTROAMPHETAMINE SACCHARATE, AMPHETAMINE ASPARTATE, DEXTROAMPHETAMINE SULFATE AND AMPHETAMINE SULFATE 5; 5; 5; 5 MG/1; MG/1; MG/1; MG/1
20 TABLET ORAL 3 TIMES DAILY
COMMUNITY

## 2024-04-14 NOTE — ED NOTES
Unable to complete med rec at this time. Pt unable to participate in interview and pharmacies are closed.

## 2024-04-14 NOTE — DISCHARGE PLANNING
Alert Team/Behavioral Health   Note:      - [facility]: Talked to RN (Lala). No beds currently available/at capacity. Referral is on pending list.    - [facility] ([facility]): Talked to Intake RN (Johanny). Referral is on pending list. No adult female beds currently available.    - [facility] : No beds currently available.

## 2024-04-14 NOTE — ED NOTES
"Pt resting on gurney, no acute distress noted. 1:1 sitter outside of room in line of sight to pt.   Discussed new order for resperidone with patient, pt shakes her head \"no\" and says \"I'm not taking it.\" Will not give any reasoning as the why she does not want it, pt is not receptive to teaching at this time. Lunch tray offered to pt and she refused.   Pt also refused to provide urine sample for UA at this time.   "

## 2024-04-14 NOTE — PROGRESS NOTES
"ED Observation Progress Note    Date of Service: 04/13/24    Interval History and Interventions  33-year-old female on legal hold awaiting transfer to psychiatric facility.  Patient presents evening acutely psychotic.  Reassuring/nonactionable laboratory analysis noted below.  As such medically cleared at this time, no indication for inpatient medical admission.  She is still quite sedated on my independent assessment; thankfully labs are reassuring and nonactionable.  Plan is for inpatient psychiatric management given severe acute psychosis and unable to care for self.    Physical Exam  /71   Pulse 89   Temp 36.4 °C (97.6 °F) (Temporal)   Resp 16   Ht 1.575 m (5' 2\")   Wt 53.5 kg (118 lb)   SpO2 98%   BMI 21.58 kg/m² .    Constitutional: Sedated but easily arousable to voice.  HENT:  Grossly normal  Eyes: Grossly normal  Neck: Normal range of motion  Cardiovascular: Normal heart rate   Thorax & Lungs: No respiratory distress  Skin:  No pathologic rash.   Extremities: Well perfused  Psychiatric: Acutely delusional, limited history, word salad.    Labs  Results for orders placed or performed during the hospital encounter of 04/13/24   CBC WITH DIFFERENTIAL   Result Value Ref Range    WBC 12.1 (H) 4.8 - 10.8 K/uL    RBC 4.65 4.20 - 5.40 M/uL    Hemoglobin 14.4 12.0 - 16.0 g/dL    Hematocrit 43.2 37.0 - 47.0 %    MCV 92.9 81.4 - 97.8 fL    MCH 31.0 27.0 - 33.0 pg    MCHC 33.3 32.2 - 35.5 g/dL    RDW 41.0 35.9 - 50.0 fL    Platelet Count 266 164 - 446 K/uL    MPV 10.2 9.0 - 12.9 fL    Neutrophils-Polys 75.30 (H) 44.00 - 72.00 %    Lymphocytes 16.20 (L) 22.00 - 41.00 %    Monocytes 7.30 0.00 - 13.40 %    Eosinophils 0.40 0.00 - 6.90 %    Basophils 0.40 0.00 - 1.80 %    Immature Granulocytes 0.40 0.00 - 0.90 %    Nucleated RBC 0.00 0.00 - 0.20 /100 WBC    Neutrophils (Absolute) 9.13 (H) 1.82 - 7.42 K/uL    Lymphs (Absolute) 1.97 1.00 - 4.80 K/uL    Monos (Absolute) 0.89 (H) 0.00 - 0.85 K/uL    Eos (Absolute) " 0.05 0.00 - 0.51 K/uL    Baso (Absolute) 0.05 0.00 - 0.12 K/uL    Immature Granulocytes (abs) 0.05 0.00 - 0.11 K/uL    NRBC (Absolute) 0.00 K/uL   COMP METABOLIC PANEL   Result Value Ref Range    Sodium 141 135 - 145 mmol/L    Potassium 3.8 3.6 - 5.5 mmol/L    Chloride 104 96 - 112 mmol/L    Co2 19 (L) 20 - 33 mmol/L    Anion Gap 18.0 (H) 7.0 - 16.0    Glucose 65 65 - 99 mg/dL    Bun 15 8 - 22 mg/dL    Creatinine 0.70 0.50 - 1.40 mg/dL    Calcium 8.7 8.4 - 10.2 mg/dL    Correct Calcium 8.7 8.5 - 10.5 mg/dL    AST(SGOT) 28 12 - 45 U/L    ALT(SGPT) 15 2 - 50 U/L    Alkaline Phosphatase 55 30 - 99 U/L    Total Bilirubin 0.5 0.1 - 1.5 mg/dL    Albumin 4.0 3.2 - 4.9 g/dL    Total Protein 6.6 6.0 - 8.2 g/dL    Globulin 2.6 1.9 - 3.5 g/dL    A-G Ratio 1.5 g/dL   ETHYL ALCOHOL (BLOOD)   Result Value Ref Range    Diagnostic Alcohol <10.1 <10.1 mg/dL   HCG QUAL SERUM   Result Value Ref Range    Beta-Hcg Qualitative Serum Negative Negative   ESTIMATED GFR   Result Value Ref Range    GFR (CKD-EPI) 116 >60 mL/min/1.73 m 2       Radiology  No orders to display       Problem List  1.   1. Altered mental status, unspecified altered mental status type        2. Acute psychosis (HCC)             Electronically signed by: Reyna Falcon M.D., 4/13/2024 10:36 PM

## 2024-04-14 NOTE — ED NOTES
Med rec is complete per [facility]. Pt unable to participate in interview. [facility] had several profiles for Pt, but hadn't filled for Pt since 2020.    Allergies reviewed [facility] and [facility] do not have allergies listed on Pt.    Has patient had any outside antibiotics in the last 30 days? N    Any Anticoagulants (rivaroxaban, apixaban, edoxaban, dabigatran, warfarin, enoxaparin) taken in the last 14 days? N         Pharmacy/Pharmacies Pt utilizes : [facility] [phone]

## 2024-04-14 NOTE — DISCHARGE PLANNING
[facility] ED Behavioral Health Fax Referral      [facility] ED Behavioral Health Alert Team:  [phone]    Referral: Legal Hold    Intervention: Patient referral to [facility] inpatient  facilty    Legal Hold Initiated: Date: 04/14/24 Time: 2140    Patient’s Insurance Listed on Face Sheet: [place] [employer]    Referrals sent to: [facility], [facility]    Referrals faxed by Larry Smalls RN, MBA    This referral contains the following information:  Face sheet ___x_  Page 1 and Page 2 of Legal Hold ___x_  Alert Team Assessment/Psych Assessment ____  Head to toe physical exam __x__  Urine Drug Screen ___x_  Blood Alcohol __x__  Vital signs __x__  Pregnancy test when applicable _x__  Medications list ____  Covid screening ____    Plan: Patient will transfer to mental health facility once acceptance is obtained

## 2024-04-14 NOTE — ED NOTES
Pt appears to be sleeping, she is resting on gurney with eyes closed, respirations observers and are nonlabored with equal rise and fall of chest noted.   1:1 sitter outside of pt room in line of sight to pt.
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
## 2024-04-14 NOTE — PROGRESS NOTES
"ED Observation Progress Note    Date of Service: 04/14/24    Interval History and Interventions  The patient has been resting comfortably throughout the evening and is required no intervention on my exam.  We attempted evaluation by psychiatry but due to her sedation and inability to cooperate this was not effective and the patient will be reevaluated tomorrow.  Will keep the patient on a hold until she can be effectively evaluated by psychiatry.  Urine drug screen is still currently pending as she is and staff has not collected the urine.    Physical Exam  BP 99/72   Pulse 72   Temp 36.4 °C (97.5 °F) (Temporal)   Resp 15   Ht 1.575 m (5' 2\")   Wt 53.5 kg (118 lb)   SpO2 96%   BMI 21.58 kg/m² .    Constitutional: Patient is sedated  HENT:  Grossly normal  Eyes: Grossly normal  Neck: Normal range of motion  Cardiovascular: Normal heart rate   Thorax & Lungs: No respiratory distress  Abdomen: Nontender  Skin:  No pathologic rash.   Extremities: Well perfused  Psychiatric: Patient will not speak    Labs  Results for orders placed or performed during the hospital encounter of 04/13/24   CBC WITH DIFFERENTIAL   Result Value Ref Range    WBC 12.1 (H) 4.8 - 10.8 K/uL    RBC 4.65 4.20 - 5.40 M/uL    Hemoglobin 14.4 12.0 - 16.0 g/dL    Hematocrit 43.2 37.0 - 47.0 %    MCV 92.9 81.4 - 97.8 fL    MCH 31.0 27.0 - 33.0 pg    MCHC 33.3 32.2 - 35.5 g/dL    RDW 41.0 35.9 - 50.0 fL    Platelet Count 266 164 - 446 K/uL    MPV 10.2 9.0 - 12.9 fL    Neutrophils-Polys 75.30 (H) 44.00 - 72.00 %    Lymphocytes 16.20 (L) 22.00 - 41.00 %    Monocytes 7.30 0.00 - 13.40 %    Eosinophils 0.40 0.00 - 6.90 %    Basophils 0.40 0.00 - 1.80 %    Immature Granulocytes 0.40 0.00 - 0.90 %    Nucleated RBC 0.00 0.00 - 0.20 /100 WBC    Neutrophils (Absolute) 9.13 (H) 1.82 - 7.42 K/uL    Lymphs (Absolute) 1.97 1.00 - 4.80 K/uL    Monos (Absolute) 0.89 (H) 0.00 - 0.85 K/uL    Eos (Absolute) 0.05 0.00 - 0.51 K/uL    Baso (Absolute) 0.05 0.00 - 0.12 " K/uL    Immature Granulocytes (abs) 0.05 0.00 - 0.11 K/uL    NRBC (Absolute) 0.00 K/uL   COMP METABOLIC PANEL   Result Value Ref Range    Sodium 141 135 - 145 mmol/L    Potassium 3.8 3.6 - 5.5 mmol/L    Chloride 104 96 - 112 mmol/L    Co2 19 (L) 20 - 33 mmol/L    Anion Gap 18.0 (H) 7.0 - 16.0    Glucose 65 65 - 99 mg/dL    Bun 15 8 - 22 mg/dL    Creatinine 0.70 0.50 - 1.40 mg/dL    Calcium 8.7 8.4 - 10.2 mg/dL    Correct Calcium 8.7 8.5 - 10.5 mg/dL    AST(SGOT) 28 12 - 45 U/L    ALT(SGPT) 15 2 - 50 U/L    Alkaline Phosphatase 55 30 - 99 U/L    Total Bilirubin 0.5 0.1 - 1.5 mg/dL    Albumin 4.0 3.2 - 4.9 g/dL    Total Protein 6.6 6.0 - 8.2 g/dL    Globulin 2.6 1.9 - 3.5 g/dL    A-G Ratio 1.5 g/dL   HIV AG/AB COMBO ASSAY SCREENING   Result Value Ref Range    HIV Ag/Ab Combo Assay Non-Reactive Non Reactive   T.PALLIDUM AB PETTY (SCREENING)   Result Value Ref Range    Syphilis, Treponemal Qual Non-Reactive Non-Reactive   ETHYL ALCOHOL (BLOOD)   Result Value Ref Range    Diagnostic Alcohol <10.1 <10.1 mg/dL   HCG QUAL SERUM   Result Value Ref Range    Beta-Hcg Qualitative Serum Negative Negative   ESTIMATED GFR   Result Value Ref Range    GFR (CKD-EPI) 116 >60 mL/min/1.73 m 2       Radiology  No orders to display       Problem List  1.  Altered mental status  2.  Acute psychosis    Electronically signed by: Jarad Esparza M.D., 4/14/2024 1:48 PM

## 2024-04-14 NOTE — CONSULTS
Alert Team    No consult necessary. Referrals to inpatient faxed to appropriate facilities.    Larry Smalls RN SOWMYA
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
## 2024-04-14 NOTE — DISCHARGE PLANNING
Received a call from Ming at [facility]. He said that when bed becomes available pt will be accepted. He does not anticipate beds being available tomorrow as it is Sunday. Most likely a bed will be available on Mon around 10-11am when they do their discharges.

## 2024-04-14 NOTE — ED NOTES
Report received from Amber ENCINAS. Pt has discharge to [facility] pending bed availability, possibly tomorrow.   Pt appears to be sleeping, she is resting on gurney with eyes closed, respirations observed and are nonlabored with equal rise and fall of chest.   1:1 sitter outside of pt room in line of sight to pt. Sitter states she received a report from previous shift RN and denies having any questions or concerns regarding pt.

## 2024-04-14 NOTE — ED NOTES
Pt resting on gurney, no acute distress noted. 1:1 sitter outside of room in line of sight to pt.

## 2024-04-14 NOTE — ED NOTES
Pt in direct view of the 1:1 sitter, pt is resting on the gurney with equal rise and fall of the chest noted.

## 2024-04-15 VITALS
HEIGHT: 62 IN | WEIGHT: 118 LBS | BODY MASS INDEX: 21.71 KG/M2 | OXYGEN SATURATION: 96 % | SYSTOLIC BLOOD PRESSURE: 94 MMHG | HEART RATE: 95 BPM | TEMPERATURE: 97.7 F | DIASTOLIC BLOOD PRESSURE: 58 MMHG | RESPIRATION RATE: 16 BRPM

## 2024-04-15 NOTE — ED NOTES
Assumed pt care, all personal belongings have been removed from room.  There is no unnecessary equipment in the room.  Pt is resting quietly and arouses easily to voice.

## 2024-04-15 NOTE — DISCHARGE PLANNING
Alert Team:    Received call from HUANG Clark at [facility] stating pt has been accepted by Dr. Juan for admission. [facility] requesting 9:00 AM (4/15/24) transfer time. Relayed information to  Unit Clerk.

## 2024-04-15 NOTE — ED NOTES
"Pt has refused breakfast and lunch today and has also refused water. Pt is sleepy but wakes easily and answers questions. FSBG checked and was 52, 8 oz OJ consumed by pt. ERP notified of BG and intervention.   Discussed ordered risperidone with pt she refuses and states \"that is not a medication I take, I wont take that.\"   Report given to Venancio ENCINAS.   "

## 2024-04-15 NOTE — DISCHARGE SUMMARY
"  ED Observation Discharge Summary    Patient:Anika Woody  Patient : 1990  Patient MRN: [id number]  Patient PCP: Pcp Pt States None    Admit Date: 2024  Discharge Date and Time: 04/15/24 9:10AM  Discharge Diagnosis: Delusional behavior  Discharge Attending: Marlene Martinez M.D.  Discharge Service: ED Observation    ED Course  Anika is a 33 y.o. female who was evaluated at [facility] for delusional behavior.  She was placed on a legal hold and was awaiting transfer to psychiatric facility.  Patient was excepted at [facility] and was subsequently discharged in stable condition.    Discharge Exam:  BP 99/72   Pulse 72   Temp 36.4 °C (97.5 °F) (Temporal)   Resp 15   Ht 1.575 m (5' 2\")   Wt 53.5 kg (118 lb)   SpO2 96%   BMI 21.58 kg/m² .    Constitutional: Awake and alert. Nontoxic  HENT:  Grossly normal  Eyes: Grossly normal  Neck: Normal range of motion  Cardiovascular: Normal heart rate   Thorax & Lungs: No respiratory distress  Skin:  No pathologic rash.   Extremities: Well perfused  Psychiatric: Affect normal    Labs  Results for orders placed or performed during the hospital encounter of 24   CBC WITH DIFFERENTIAL   Result Value Ref Range    WBC 12.1 (H) 4.8 - 10.8 K/uL    RBC 4.65 4.20 - 5.40 M/uL    Hemoglobin 14.4 12.0 - 16.0 g/dL    Hematocrit 43.2 37.0 - 47.0 %    MCV 92.9 81.4 - 97.8 fL    MCH 31.0 27.0 - 33.0 pg    MCHC 33.3 32.2 - 35.5 g/dL    RDW 41.0 35.9 - 50.0 fL    Platelet Count 266 164 - 446 K/uL    MPV 10.2 9.0 - 12.9 fL    Neutrophils-Polys 75.30 (H) 44.00 - 72.00 %    Lymphocytes 16.20 (L) 22.00 - 41.00 %    Monocytes 7.30 0.00 - 13.40 %    Eosinophils 0.40 0.00 - 6.90 %    Basophils 0.40 0.00 - 1.80 %    Immature Granulocytes 0.40 0.00 - 0.90 %    Nucleated RBC 0.00 0.00 - 0.20 /100 WBC    Neutrophils (Absolute) 9.13 (H) 1.82 - 7.42 K/uL    Lymphs (Absolute) 1.97 1.00 - 4.80 K/uL    Monos (Absolute) 0.89 (H) 0.00 - 0.85 K/uL    " Eos (Absolute) 0.05 0.00 - 0.51 K/uL    Baso (Absolute) 0.05 0.00 - 0.12 K/uL    Immature Granulocytes (abs) 0.05 0.00 - 0.11 K/uL    NRBC (Absolute) 0.00 K/uL   COMP METABOLIC PANEL   Result Value Ref Range    Sodium 141 135 - 145 mmol/L    Potassium 3.8 3.6 - 5.5 mmol/L    Chloride 104 96 - 112 mmol/L    Co2 19 (L) 20 - 33 mmol/L    Anion Gap 18.0 (H) 7.0 - 16.0    Glucose 65 65 - 99 mg/dL    Bun 15 8 - 22 mg/dL    Creatinine 0.70 0.50 - 1.40 mg/dL    Calcium 8.7 8.4 - 10.2 mg/dL    Correct Calcium 8.7 8.5 - 10.5 mg/dL    AST(SGOT) 28 12 - 45 U/L    ALT(SGPT) 15 2 - 50 U/L    Alkaline Phosphatase 55 30 - 99 U/L    Total Bilirubin 0.5 0.1 - 1.5 mg/dL    Albumin 4.0 3.2 - 4.9 g/dL    Total Protein 6.6 6.0 - 8.2 g/dL    Globulin 2.6 1.9 - 3.5 g/dL    A-G Ratio 1.5 g/dL   HIV AG/AB COMBO ASSAY SCREENING   Result Value Ref Range    HIV Ag/Ab Combo Assay Non-Reactive Non Reactive   T.PALLIDUM AB PETTY (SCREENING)   Result Value Ref Range    Syphilis, Treponemal Qual Non-Reactive Non-Reactive   ETHYL ALCOHOL (BLOOD)   Result Value Ref Range    Diagnostic Alcohol <10.1 <10.1 mg/dL   HCG QUAL SERUM   Result Value Ref Range    Beta-Hcg Qualitative Serum Negative Negative   ESTIMATED GFR   Result Value Ref Range    GFR (CKD-EPI) 116 >60 mL/min/1.73 m 2   POCT glucose device results   Result Value Ref Range    POC Glucose, Blood 52 (L) 65 - 99 mg/dL       Radiology  No orders to display       Medications:   New Prescriptions    No medications on file       My final assessment includes delusional behavior  Upon Reevaluation, the patient's condition has: not improved; and will be escalated to inpatient psychiatric hospitalization.    Patient discharged from ED Observation status at 9:10 AM on 4/15/2024    Total time spent on this ED Observation discharge encounter is < 30 Minutes    Electronically signed by: Marlene Martinez M.D., 4/15/2024 6:45 AM

## 2024-04-15 NOTE — ED NOTES
Maintained 1:1 sitter for patient observation and safety. Pt not in any form of distress with appropriate behavior. Kept undisturbed.

## 2024-04-15 NOTE — ED NOTES
Maintained 1:1 sitter for patient observation and safety. Pt not in any form of distress with appropriate behavior.

## 2024-04-15 NOTE — ED NOTES
Maintained 1:1 sitter for observation and safety. Pt went back to bed. Not in any form of distress.

## 2024-04-15 NOTE — ED NOTES
Maintained 1:1 sitter for patient observation and safety. Pt not in any form of distress. Sleeping comfortably.

## 2024-04-15 NOTE — ED NOTES
Maintained 1:1 sitter for patient observation and safety. Pt not in any form of distress with appropriate behavior with unlabored breathing.

## 2024-04-15 NOTE — ED NOTES
Patient's home medications have been reviewed by the pharmacy team.   Patient presenting with ALOC and psychosis, patient now in observation status pending transfer to inpatient psych facility.   History reviewed. No pertinent past medical history.    Patient's Medications   New Prescriptions    No medications on file   Previous Medications    AMPHETAMINE-DEXTROAMPHETAMINE (ADDERALL) 20 MG TAB    Take 20 mg by mouth in the morning, at noon, and at bedtime.    ZOLPIDEM (AMBIEN) 10 MG TAB    Take 10 mg by mouth at bedtime.   Modified Medications    No medications on file   Discontinued Medications    RISPERIDONE PO    Take 1 Tablet by mouth every day.          A:  The following pharmacotherapy concerns may be contributing to current complaints:  - Patient on zolpidem and Adderall per med rec, presentation of acute psychosis may be related to Adderall use and re-initiation of Adderall may exacerbate this, not reordered at this time.    P:    No recommendations at this time. Home medications have been reordered as appropriate. Patient is being followed by psychiatry, risperidone started today.     Quintin Layne, PharmD

## 2024-04-15 NOTE — ED NOTES
Maintained 1:1 sitter for patient observation and safety. Pt not in any form of distress with appropriate behavior. Sleeping comfortably.

## 2024-04-25 ENCOUNTER — HOSPITAL ENCOUNTER (EMERGENCY)
Facility: MEDICAL CENTER | Age: 34
End: 2024-04-26
Attending: EMERGENCY MEDICINE
Payer: MEDICAID

## 2024-04-25 DIAGNOSIS — F29 PSYCHOSIS, UNSPECIFIED PSYCHOSIS TYPE (HCC): ICD-10-CM

## 2024-04-25 DIAGNOSIS — F31.9 BIPOLAR AFFECTIVE DISORDER, REMISSION STATUS UNSPECIFIED (HCC): ICD-10-CM

## 2024-04-25 PROCEDURE — 96372 THER/PROPH/DIAG INJ SC/IM: CPT

## 2024-04-25 PROCEDURE — 99285 EMERGENCY DEPT VISIT HI MDM: CPT

## 2024-04-25 PROCEDURE — 700111 HCHG RX REV CODE 636 W/ 250 OVERRIDE (IP): Mod: JZ,UD | Performed by: EMERGENCY MEDICINE

## 2024-04-25 PROCEDURE — 36415 COLL VENOUS BLD VENIPUNCTURE: CPT

## 2024-04-25 RX ORDER — HALOPERIDOL 5 MG/ML
5 INJECTION INTRAMUSCULAR ONCE
Status: COMPLETED | OUTPATIENT
Start: 2024-04-25 | End: 2024-04-25

## 2024-04-25 RX ORDER — DIPHENHYDRAMINE HYDROCHLORIDE 50 MG/ML
25 INJECTION INTRAMUSCULAR; INTRAVENOUS ONCE
Status: COMPLETED | OUTPATIENT
Start: 2024-04-25 | End: 2024-04-25

## 2024-04-25 RX ORDER — LORAZEPAM 2 MG/ML
1 INJECTION INTRAMUSCULAR ONCE
Status: COMPLETED | OUTPATIENT
Start: 2024-04-25 | End: 2024-04-25

## 2024-04-25 RX ADMIN — LORAZEPAM 1 MG: 2 INJECTION INTRAMUSCULAR; INTRAVENOUS at 23:01

## 2024-04-25 RX ADMIN — HALOPERIDOL LACTATE 5 MG: 5 INJECTION, SOLUTION INTRAMUSCULAR at 23:01

## 2024-04-25 RX ADMIN — DIPHENHYDRAMINE HYDROCHLORIDE 25 MG: 50 INJECTION, SOLUTION INTRAMUSCULAR; INTRAVENOUS at 23:01

## 2024-04-25 ASSESSMENT — FIBROSIS 4 INDEX: FIB4 SCORE: 0.9

## 2024-04-26 VITALS
SYSTOLIC BLOOD PRESSURE: 135 MMHG | TEMPERATURE: 98.1 F | WEIGHT: 118 LBS | OXYGEN SATURATION: 98 % | BODY MASS INDEX: 21.71 KG/M2 | RESPIRATION RATE: 17 BRPM | HEIGHT: 62 IN | HEART RATE: 90 BPM | DIASTOLIC BLOOD PRESSURE: 64 MMHG

## 2024-04-26 LAB — ETHANOL BLD-MCNC: <10.1 MG/DL

## 2024-04-26 PROCEDURE — 82077 ASSAY SPEC XCP UR&BREATH IA: CPT

## 2024-04-26 RX ORDER — RISPERIDONE 2 MG/1
2 TABLET ORAL EVERY EVENING
Qty: 30 TABLET | Refills: 0 | Status: SHIPPED | OUTPATIENT
Start: 2024-04-26 | End: 2024-05-26
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
## 2024-04-26 NOTE — ED NOTES
Security at bedside; patient was cooperative the entire time; belongings taken; changed to gown; medicated per MAR

## 2024-04-26 NOTE — DISCHARGE PLANNING
"Alert Team:    Pt is a 32 y/o female presenting to ED by law enforcement for inability to care for self/psychosis. She presented cuffed with PD after being found wandering around [place] banging on doors and trying to get into homes. Per law enforcement, she was acting erratic and agitated and told authorities \"let me go so I can break into the house and murder everyone.\" Pt DID NOT make any HI comments to ED staff and asked multiple times to leave the facility. Pt chanting/singing, but able to hold small conversation when talked to directly and followed simple commands. She was given a B52 w/ security present and changed into paper gowns. Plan to re-evaluated when she wakes up and see if she presents clear for discharge.Telesitter has been ordered. Nurse aware of plan of care.   "
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
## 2024-04-26 NOTE — DISCHARGE SUMMARY
"  ED Observation Discharge Summary    Patient:Anika Woody  Patient : 1990  Patient MRN: [id number]  Patient PCP: Pcp Pt States None    Admit Date: 2024  Discharge Date and Time: 24 6:51 AM  Discharge Diagnosis:   1. Psychosis, unspecified psychosis type (HCC)    2. Bipolar affective disorder, remission status unspecified (HCC)        Discharge Attending: David Terry II, M.D.  Discharge Service: ED Observation    ED Course  Anika is a 33 y.o. female who was evaluated at [facility] emergency department after being brought by police.  She was found wandering in the [facility] knocking on people's doors and going on the private property.  She was chanting and seen frequently, difficult to redirect.  Made some comments to police about looking for people to murder.  She was placed on a involuntary hold by police.  On arrival here she continues to chant and dance.  Unable to get any linear history from her.  She was given Haldol, Benadryl and Ativan.  She slept for several hours.  When she was more alert, she was interviewed by our behavioral health nurse Sierra.  She denies any thoughts of harming herself or others.  She has a linear thought process now.  She will be able to be discharged.  It is unclear if she was on some type of drug or if this was uncontrolled bipolar disorder.  Doing much better now.  I did provide a refill for Risperdal which I can see she was taking previously.    Discharge Exam:  /64   Pulse 90   Temp 36.7 °C (98.1 °F)   Resp 17   Ht 1.575 m (5' 2\")   Wt 53.5 kg (118 lb)   SpO2 98%   BMI 21.58 kg/m² .    Constitutional: Awake and alert. Nontoxic  HENT:  Grossly normal  Eyes: Grossly normal  Neck: Normal range of motion  Cardiovascular: Normal heart rate   Thorax & Lungs: No respiratory distress  Abdomen: Nontender  Skin: Light abrasion on left wrist  Extremities: Well perfused  Psychiatric: Affect normal    Labs  Results for orders placed or " performed during the hospital encounter of 04/25/24   DIAGNOSTIC ALCOHOL   Result Value Ref Range    Diagnostic Alcohol <10.1 <10.1 mg/dL       Radiology  No orders to display       Medications:   New Prescriptions    RISPERIDONE (RISPERDAL) 2 MG TAB    Take 1 Tablet by mouth every evening for 30 days.       My final assessment includes   1. Psychosis, unspecified psychosis type (HCC)    2. Bipolar affective disorder, remission status unspecified (HCC)        Upon Reevaluation, the patient's condition has: Improved; and will be discharged.    Patient discharged from ED Observation status at 4/26/2024 6:51 AM      Total time spent on this ED Observation discharge encounter is < 30 Minutes    Electronically signed by: David Terry II, M.D., 4/26/2024 6:55 AM

## 2024-04-26 NOTE — ED NOTES
"Patient standing by the glass door; \"I am a ; Im free whatever I want to do and you can't tell me what to do\" and starts singing again each time thus RN ask question  "

## 2025-03-12 ENCOUNTER — HOSPITAL ENCOUNTER (EMERGENCY)
Facility: MEDICAL CENTER | Age: 35
End: 2025-03-13
Attending: EMERGENCY MEDICINE
Payer: MEDICAID

## 2025-03-12 DIAGNOSIS — F29 PSYCHOSIS, UNSPECIFIED PSYCHOSIS TYPE (HCC): ICD-10-CM

## 2025-03-12 PROCEDURE — 99285 EMERGENCY DEPT VISIT HI MDM: CPT

## 2025-03-12 ASSESSMENT — FIBROSIS 4 INDEX: FIB4 SCORE: 0.92

## 2025-03-13 VITALS
HEART RATE: 73 BPM | RESPIRATION RATE: 16 BRPM | OXYGEN SATURATION: 98 % | DIASTOLIC BLOOD PRESSURE: 61 MMHG | TEMPERATURE: 98.5 F | SYSTOLIC BLOOD PRESSURE: 105 MMHG | BODY MASS INDEX: 22.08 KG/M2 | WEIGHT: 120 LBS | HEIGHT: 62 IN

## 2025-03-13 LAB
AMPHET UR QL SCN: NEGATIVE
BARBITURATES UR QL SCN: NEGATIVE
BENZODIAZ UR QL SCN: NEGATIVE
BZE UR QL SCN: NEGATIVE
CANNABINOIDS UR QL SCN: NEGATIVE
FENTANYL UR QL: NEGATIVE
METHADONE UR QL SCN: NEGATIVE
OPIATES UR QL SCN: NEGATIVE
OXYCODONE UR QL SCN: NEGATIVE
PCP UR QL SCN: NEGATIVE
POC BREATHALIZER: 0 PERCENT (ref 0–0.01)
PROPOXYPH UR QL SCN: NEGATIVE

## 2025-03-13 PROCEDURE — 90791 PSYCH DIAGNOSTIC EVALUATION: CPT

## 2025-03-13 PROCEDURE — 80307 DRUG TEST PRSMV CHEM ANLYZR: CPT

## 2025-03-13 PROCEDURE — 302970 POC BREATHALIZER: Performed by: EMERGENCY MEDICINE

## 2025-03-13 NOTE — ED NOTES
Report to EMS, pt ambulated to ambulance bay with steady gait. Pt transferring to [facility]. All belongings given to EMS.

## 2025-03-13 NOTE — DISCHARGE PLANNING
[facility] ED Behavioral Health Fax Referral      Referral: Legal Hold    Intervention: Patient referral to community inpatient  facillities: [facility], [facility], [facility], [facility]    Legal Hold Initiated: Date: 3/13/2025 Time: 0200    Patient’s Insurance Listed on Face Sheet: [employer]    Referrals sent to:  [facility], [facility], [facility] , [facility]    Referrals faxed by ANDREINA FORTUNE    This referral contains the following information:  Face sheet _X___  Page 1 and Page 2 of Legal Hold __X__  Alert Team Assessment/Psych Assessment __X__  Head to toe physical exam __X__  Urine Drug Screen _X___  Blood Alcohol __X__  Vital signs __X__  Pregnancy test when applicable ___  Medications list _X___  Covid screening ____    Plan: Patient will transfer to mental health facility once acceptance is obtained    For all referral information, please contact the  referral office daily between the hours of 7:00 a.m. to 6:00 p.m. at:   Phone [phone]   Fax [phone]    ED  Alert Team   Phone [phone] Fax [phone]    [facility] Emergency [facility] Contact numbers:  Green Pod [phone]   Blue Pod [phone]   Red Pod [phone]   Pediatrics [phone]

## 2025-03-13 NOTE — ED NOTES
Report received from off going RN: Ralph RN, assumed care of patient.  Patient resting on gurney, eyes closed, respirations even and unlabored. Bed in lowest position.  Patient wearing paper scrubs.  1:1 sitter in place.  Safety precautions in place.

## 2025-03-13 NOTE — ED PROVIDER NOTES
"ED Provider Note    CHIEF COMPLAINT  Chief Complaint   Patient presents with    Psych Eval     Pt not providing verbal responses in triage. Pt withdrawn, staring at wall.        EXTERNAL RECORDS REVIEWED  Inpatient Notes most recent ER note 4/25/2024 when the patient was seen for psychosis.  Additionally seen at [facility] ER 4/13/2024 for altered mental status    HPI/ROS  LIMITATION TO HISTORY   Select: Behavior  OUTSIDE HISTORIAN(S):  None    Anika Woody is a 34 y.o. female who presents to the emergency department with allegedly suicidal ideation or possible attempt.  Stating that she may have or did jump off of a bridge [place].  Otherwise history is quite limited secondary to overall behavior.  Patient with significant paranoia    PAST MEDICAL HISTORY       SURGICAL HISTORY   has a past surgical history that includes tonsillectomy (6/2007) and mammoplasty augmentation (5/10/2011).    FAMILY HISTORY  Family History   Problem Relation Age of Onset    Hypertension Other     Cancer Other        SOCIAL HISTORY  Social History     Tobacco Use    Smoking status: Never    Smokeless tobacco: Never   Substance and Sexual Activity    Alcohol use: Yes     Comment: once a week    Drug use: Yes     Types: Oral     Comment: edible    Sexual activity: Not on file       CURRENT MEDICATIONS  Home Medications       Reviewed by Kayce Malloy R.N. (Registered Nurse) on 03/12/25 at 2210  Med List Status: Not Addressed     Medication Last Dose Status   amphetamine-dextroamphetamine (ADDERALL) 20 MG Tab  Active   zolpidem (AMBIEN) 10 MG Tab  Active                    ALLERGIES  No Known Allergies    PHYSICAL EXAM  VITAL SIGNS: /72   Pulse 92   Temp 36.6 °C (97.8 °F) (Temporal)   Resp 16   Ht 1.575 m (5' 2\")   Wt 54.4 kg (120 lb)   LMP  (LMP Unknown)   SpO2 98%   BMI 21.95 kg/m²      Pulse ox interpretation: I interpret this pulse ox as normal.  Constitutional: Alert in no apparent " distress.  HENT: No signs of trauma, Bilateral external ears normal, Nose normal.   Eyes: Pupils are equal and reactive  Thorax & Lungs: No respiratory distress  Skin: Warm, Dry  Musculoskeletal: Good range of motion in all major joints. No tenderness to palpation or major deformities noted.   Neurologic: Alert , Normal motor function, Normal sensory function, No focal deficits noted.   Psychiatric: Suicidal ideation.  Paranoid.  Appears acutely psychotic        EKG/LABS  Results for orders placed or performed during the hospital encounter of 03/12/25   POC BREATHALIZER    Collection Time: 03/13/25 12:04 AM   Result Value Ref Range    POC Breathalizer 0.00 0.00 - 0.01 Percent       COURSE & MEDICAL DECISION MAKING    ASSESSMENT, COURSE AND PLAN  Care Narrative:     ED OBS: Yes; I am placing the patient in to an observation status due to a diagnostic uncertainty as well as therapeutic intensity. Patient placed in observation status at 12:02 AM, 3/13/2025.     Observation plan is as follows: Patient presenting with acute abnormal behavior.  High concern for recurrent psychosis.  Without any additional medical complications patient is medically cleared and will be seen by alert team    Legal hold upheld.  Will await psychiatric transfer                  DISPOSITION AND DISCUSSIONS  I have discussed management of the patient with the following physicians and TRUE's: None    Discussion of management with other QHP or appropriate source(s): Behavioral Health Dae was alert team is evaluated patient      34-year-old presenting to the [facility] part with above presentation.  Patient on legal hold.  Will await further psychiatric transfer.  Patient remains in ED observation and has been signed out to my colleague.    FINAL DIAGNOSIS  1. Psychosis, unspecified psychosis type (HCC)         Electronically signed by: Malachi Powers M.D., 3/13/2025 12:02 AM

## 2025-03-13 NOTE — ED NOTES
Rounded on patient.  Patient sleeping on gurney in room.  Respirations even, regular, and unlabored.   Patient denies any needs at this time.  Muarisio Reveles at bedside.  Patient remains on 1:1 monitoring.

## 2025-03-13 NOTE — ED NOTES
Bedside report from HUANG Diaz. Necessary fall precautions in place. Pt is on RA. GCS 15.  Pt resting in gurney with even and unlabored chest rise and fall, awakens easily with stimulation. 1:1 sitter in place. Report provided to sitter and STOP sign updated.  L2K in chart

## 2025-03-13 NOTE — ED TRIAGE NOTES
"Chief Complaint   Patient presents with    Psych Eval     Pt not providing verbal responses in triage. Pt withdrawn, staring at wall.      After completing triage and showing pt back to lobby pt verbally aggressive with this RN. Pt stating \"you need to be in the hospital\" and \"it's people like you that ruin this world\".     /71   Pulse 87   Temp 36.6 °C (97.8 °F) (Temporal)   Resp 14   Ht 1.575 m (5' 2\")   Wt 54.4 kg (120 lb)   SpO2 97%      "

## 2025-03-13 NOTE — ED NOTES
Patient sleeping on gurney in room. Respirations appear even regular, and unlabored. Airway visible and uncovered.   1:1 sitter Lanie at bedside and informed of L2K status.

## 2025-03-13 NOTE — ED NOTES
PT is unable to participate in med rec interview.    Medication history reviewed historically and with PT's home pharmacy [facility] on [address] ([phone]) over the phone.    Med rec is complete per historic review and [facility] reporting.    Allergies reviewed historically.     Patient had no history of any outpatient antibiotics in the last 30 days.     Patient has no history of taking anticoagulants.    Dispense history is not available.    Adderall 20mg and Zolpidem 10mg were removed from PT's Med Rec after  review. PT has not been prescribed adderall since 10/2024. PT has not been prescribed zolpidem since 04/2024.    Preferred pharmacy for this visit - [facility] ([phone])

## 2025-03-13 NOTE — ED NOTES
Rounded on patient.  Patient sleeping on gurney in room.  Respirations even, regular, and unlabored.     Sitter Marlene at bedside.  Patient remains on 1:1 monitoring.

## 2025-03-13 NOTE — DISCHARGE SUMMARY
"  ED Observation Discharge Summary    Patient:Anika Woody  Patient : 1990  Patient MRN: [id number]  Patient PCP: Pcp Pt States None    Admit Date: 3/12/2025  Discharge Date and Time: 25 12:15 PM  Discharge Diagnosis:   1. Psychosis, unspecified psychosis type (HCC)            Discharge Attending: Lindsay Salcedo M.D.  Discharge Service: ED Observation    ED Course  Anika is a 34 y.o. female who was evaluated at [facility] ER for psychotic symptoms.    Patient was noted to be alert but withdrawn and staring at the wall triage.  Likely responding to internal stimuli.  She has remained psychotic.  She stated \"I do not want you to touch me\" when I entered the room to evaluate her today.  She states her feet are burning.    Patient will be transferred to [facility].    Discharge Exam:  /61   Pulse 73   Temp 36.9 °C (98.5 °F) (Temporal)   Resp 16   Ht 1.575 m (5' 2\")   Wt 54.4 kg (120 lb)   LMP  (LMP Unknown)   SpO2 98%   BMI 21.95 kg/m² .    Constitutional: Awake and alert. Nontoxic  HENT:  Grossly normal  Eyes: Grossly normal  Neck: No stridor  Cardiovascular: Normal heart rate   Thorax & Lungs: No respiratory distress  Abdomen: Not distended  Skin:  No pathologic rash.   Extremities: Well perfused, no erythema  Psychiatric: Affect flat    Labs  Results for orders placed or performed during the hospital encounter of 25   POC BREATHALIZER    Collection Time: 25 12:04 AM   Result Value Ref Range    POC Breathalizer 0.00 0.00 - 0.01 Percent   Urine Drug Screen    Collection Time: 25  5:10 AM   Result Value Ref Range    Amphetamines Urine Negative Negative    Barbiturates Negative Negative    Benzodiazepines Negative Negative    Cocaine Metabolite Negative Negative    Fentanyl, Urine Negative Negative    Methadone Negative Negative    Opiates Negative Negative    Oxycodone Negative Negative    Phencyclidine -Pcp Negative Negative    Propoxyphene Negative " Negative    Cannabinoid Metab Negative Negative       Radiology  No orders to display       Medications:   New Prescriptions    No medications on file       My final assessment includes   1. Psychosis, unspecified psychosis type (HCC)            Upon Reevaluation, the patient's condition has: not improved; and will be escalated to hospitalization.    Patient discharged from ED Observation status at 1330 (Time) 3/13/25 (Date).     Total time spent on this ED Observation discharge encounter is < 30 Minutes    Electronically signed by: Lindsay Salcedo M.D., 3/13/2025 12:15 PM

## 2025-03-13 NOTE — ED NOTES
Pt ambulated w/ even and steady gait, even and unlabored respirations from waiting room to Green [address]. Pt oriented to room, hooked up to monitor, call light w/in reach. No further needs at this time.
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
## 2025-03-13 NOTE — ED NOTES
Patient placed on L2K by alert team RN. Original L2K in chart. ERP informed.    Patient sleeping on gurney in room. Respirations appear even regular, and unlabored. Airway visible and uncovered.   1:1 sitter Marlene at bedside and informed of L2K status.

## 2025-03-13 NOTE — ED NOTES
Bedside report received from off going RN/tech: Lauren BERG RN. Ralph Chambers RN, assumed care of patient.  POC discussed with patient. All needs addressed at this time.       Fall risk interventions in place: Not Applicable (all applicable per [facility] Fall risk assessment)   Continuous monitoring: Not Applicable   IVF/IV medications: Not Applicable   Oxygen: Room Air  Bedside sitter: checklist completed, stop sign in doorway Sitter Marlene at bedside  Isolation: Not Applicable

## 2025-03-13 NOTE — DISCHARGE PLANNING
Legal Hold Transfer     Referral: Legal hold transfer to [facility]     Intervention: Notified by  Michelle that pt has been accepted to [facility].     Pt's accepting physcian is Dr. Yessica Talamantes to [facility]     Transport arranged through [facility]     The pt will be picked up at 1330      Notified Bedside RN Joe, Alert Team HUANG Cintron, and Dr. Salcedo of the departure time as well as accepting facility.      Transfer packet to be created with original legal hold and placed on chart.      Plan: Pt will be transferring to [facility] today at 1330 via [facility].

## 2025-03-13 NOTE — DISCHARGE PLANNING
Alert Team Note     Spoke to Lauren at [facility], pt has been accepted. Accepting is Dr. Juan. Requesting transfer today at 1330    Notified bedside RN Joe, Dr. Salcedo, Alert Team HUANG Cintron, and Alert Teams SUNITA Mosquera

## 2025-03-13 NOTE — ED NOTES
Patient ambulatory to the restroom and back.  Currently resting on gurney.  1:1 sitter remains in place.